# Patient Record
Sex: FEMALE | Race: WHITE | NOT HISPANIC OR LATINO | Employment: OTHER | URBAN - METROPOLITAN AREA
[De-identification: names, ages, dates, MRNs, and addresses within clinical notes are randomized per-mention and may not be internally consistent; named-entity substitution may affect disease eponyms.]

---

## 2021-03-17 LAB
ABO: NORMAL
ALBUMIN: 3.9 G/DL (ref 3.4–5)
ALP BLD-CCNC: 73 U/L (ref 33–136)
ALT SERPL-CCNC: 11 U/L (ref 7–45)
ANION GAP SERPL CALCULATED.3IONS-SCNC: 13 MMOL/L (ref 10–20)
ANTIBODY SCREEN: NORMAL
AST SERPL-CCNC: 18 U/L (ref 9–39)
BICARBONATE: 28 MMOL/L (ref 21–32)
BILIRUB SERPL-MCNC: 0.8 MG/DL (ref 0–1.2)
C-REACTIVE PROTEIN: 1.98 MG/DL
CALCIUM SERPL-MCNC: 10 MG/DL (ref 8.6–10.3)
CHLORIDE BLD-SCNC: 101 MMOL/L (ref 98–107)
CREAT SERPL-MCNC: 0.86 MG/DL (ref 0.5–1)
ERYTHROCYTE [DISTWIDTH] IN BLOOD BY AUTOMATED COUNT: 13.3 % (ref 11.5–14)
GFR AFRICAN AMERICAN: >60 ML/MIN/1.73M2
GFR NON-AFRICAN AMERICAN: >60 ML/MIN/1.73M2
GLUCOSE: 86 MG/DL (ref 74–99)
HCT VFR BLD CALC: 35.3 % (ref 36–46)
HEMOGLOBIN: 11.4 G/DL (ref 12–16)
MCHC RBC AUTO-ENTMCNC: 32.3 G/DL (ref 32–36)
MCV RBC AUTO: 95 FL (ref 80–100)
NUCLEATED RBCS: 0 /100 WBC (ref 0–0)
PLATELET # BLD: 232 X10E9/L (ref 150–450)
POTASSIUM SERPL-SCNC: 4.4 MMOL/L (ref 3.5–5.3)
RBC # BLD: 3.71 X10E12/L (ref 4–5.2)
RH TYPE: NORMAL
SODIUM BLD-SCNC: 138 MMOL/L (ref 136–145)
TOTAL PROTEIN: 7.4 G/DL (ref 6.4–8.2)
UREA NITROGEN: 14 MG/DL (ref 6–23)
WBC: 7 X10E9/L (ref 4.4–11.3)

## 2021-03-22 LAB
SARS-COV-2, PCR: NOT DETECTED
SPECIMEN SOURCE: NORMAL

## 2021-03-24 LAB
ABO: NORMAL
APPEARANCE: CLEAR
BILIRUBIN, URINE: NEGATIVE
BLOOD, URINE: NEGATIVE
COLOR, URINE: NORMAL
GLUCOSE, URINE: NEGATIVE MG/DL
KETONES, URINE: NEGATIVE MG/DL
LEUKOCYTE ESTERASE, URINE: NEGATIVE
NITRITE, URINE: NEGATIVE
PH UA: 7 (ref 5–8)
PROTEIN UA: NEGATIVE MG/DL
RH TYPE: NORMAL
SPECIFIC GRAVITY, URINE: 1.01 (ref 1–1)
SPECIMEN SOURCE: NORMAL
UROBILINOGEN, URINE: <2 MG/DL (ref 0–1.9)

## 2021-03-25 LAB
ANION GAP SERPL CALCULATED.3IONS-SCNC: 10 MMOL/L (ref 10–20)
BASOPHILS # BLD: 0.02 X10E9/L (ref 0–0.1)
BASOPHILS RELATIVE PERCENT: 0.2 % (ref 0–2)
BICARBONATE: 27 MMOL/L (ref 21–32)
CALCIUM SERPL-MCNC: 8.1 MG/DL (ref 8.6–10.3)
CHLORIDE BLD-SCNC: 104 MMOL/L (ref 98–107)
CREAT SERPL-MCNC: 0.78 MG/DL (ref 0.5–1)
EOSINOPHIL # BLD: 0 X10E9/L (ref 0–0.4)
EOSINOPHILS RELATIVE PERCENT: 0 % (ref 0–6)
ERYTHROCYTE [DISTWIDTH] IN BLOOD BY AUTOMATED COUNT: 15.1 % (ref 11.5–14)
GFR AFRICAN AMERICAN: >60 ML/MIN/1.73M2
GFR NON-AFRICAN AMERICAN: >60 ML/MIN/1.73M2
GLUCOSE: 174 MG/DL (ref 74–99)
HCT VFR BLD CALC: 28.9 % (ref 36–46)
HEMOGLOBIN: 9.4 G/DL (ref 12–16)
IMMATURE GRANULOCYTES %: 0.4 % (ref 0–0.9)
LYMPHOCYTES # BLD: 9.9 % (ref 13–44)
LYMPHOCYTES RELATIVE PERCENT: 0.92 X10E9/L (ref 0.8–3)
MCHC RBC AUTO-ENTMCNC: 32.5 G/DL (ref 32–36)
MCV RBC AUTO: 92 FL (ref 80–100)
MONOCYTES # BLD: 0.53 X10E9/L (ref 0.05–0.8)
MONOCYTES RELATIVE PERCENT: 5.7 % (ref 2–10)
NEUTROPHILS RELATIVE PERCENT: 83.8 % (ref 40–80)
NEUTROPHILS: 7.78 X10E9/L (ref 1.6–5.5)
NUCLEATED RBCS: 0 /100 WBC (ref 0–0)
PLATELET # BLD: 175 X10E9/L (ref 150–450)
POTASSIUM SERPL-SCNC: 4 MMOL/L (ref 3.5–5.3)
RBC # BLD: 3.14 X10E12/L (ref 4–5.2)
SODIUM BLD-SCNC: 137 MMOL/L (ref 136–145)
UREA NITROGEN: 15 MG/DL (ref 6–23)
WBC: 9.3 X10E9/L (ref 4.4–11.3)

## 2021-03-26 LAB
ANION GAP SERPL CALCULATED.3IONS-SCNC: 7 MMOL/L (ref 10–20)
BASOPHILS # BLD: 0.05 X10E9/L (ref 0–0.1)
BASOPHILS RELATIVE PERCENT: 0.5 % (ref 0–2)
BICARBONATE: 28 MMOL/L (ref 21–32)
CALCIUM SERPL-MCNC: 8.4 MG/DL (ref 8.6–10.3)
CHLORIDE BLD-SCNC: 106 MMOL/L (ref 98–107)
CREAT SERPL-MCNC: 0.9 MG/DL (ref 0.5–1)
EOSINOPHIL # BLD: 0.07 X10E9/L (ref 0–0.4)
EOSINOPHILS RELATIVE PERCENT: 0.7 % (ref 0–6)
ERYTHROCYTE [DISTWIDTH] IN BLOOD BY AUTOMATED COUNT: 15.3 % (ref 11.5–14)
GFR AFRICAN AMERICAN: >60 ML/MIN/1.73M2
GFR NON-AFRICAN AMERICAN: >60 ML/MIN/1.73M2
GLUCOSE: 104 MG/DL (ref 74–99)
HCT VFR BLD CALC: 26.2 % (ref 36–46)
HEMOGLOBIN: 8.3 G/DL (ref 12–16)
IMMATURE GRANULOCYTES %: 0.2 % (ref 0–0.9)
LYMPHOCYTES # BLD: 24.3 % (ref 13–44)
LYMPHOCYTES RELATIVE PERCENT: 2.33 X10E9/L (ref 0.8–3)
MCHC RBC AUTO-ENTMCNC: 31.7 G/DL (ref 32–36)
MCV RBC AUTO: 94 FL (ref 80–100)
MONOCYTES # BLD: 0.83 X10E9/L (ref 0.05–0.8)
MONOCYTES RELATIVE PERCENT: 8.6 % (ref 2–10)
NEUTROPHILS RELATIVE PERCENT: 65.7 % (ref 40–80)
NEUTROPHILS: 6.3 X10E9/L (ref 1.6–5.5)
NUCLEATED RBCS: 0 /100 WBC (ref 0–0)
PLATELET # BLD: 155 X10E9/L (ref 150–450)
POTASSIUM SERPL-SCNC: 4 MMOL/L (ref 3.5–5.3)
RBC # BLD: 2.79 X10E12/L (ref 4–5.2)
SODIUM BLD-SCNC: 137 MMOL/L (ref 136–145)
UREA NITROGEN: 18 MG/DL (ref 6–23)
VANCOMYCIN TROUGH: 19.7 UG/ML (ref 5–20)
WBC: 9.6 X10E9/L (ref 4.4–11.3)

## 2021-03-27 LAB
ANION GAP SERPL CALCULATED.3IONS-SCNC: 7 MMOL/L (ref 10–20)
BASOPHILS # BLD: 0.03 X10E9/L (ref 0–0.1)
BASOPHILS RELATIVE PERCENT: 0.5 % (ref 0–2)
BICARBONATE: 28 MMOL/L (ref 21–32)
CALCIUM SERPL-MCNC: 8.2 MG/DL (ref 8.6–10.3)
CHLORIDE BLD-SCNC: 108 MMOL/L (ref 98–107)
CREAT SERPL-MCNC: 0.81 MG/DL (ref 0.5–1)
EOSINOPHIL # BLD: 0.26 X10E9/L (ref 0–0.4)
EOSINOPHILS RELATIVE PERCENT: 4.4 % (ref 0–6)
ERYTHROCYTE [DISTWIDTH] IN BLOOD BY AUTOMATED COUNT: 15.2 % (ref 11.5–14)
GFR AFRICAN AMERICAN: >60 ML/MIN/1.73M2
GFR NON-AFRICAN AMERICAN: >60 ML/MIN/1.73M2
GLUCOSE: 93 MG/DL (ref 74–99)
HCT VFR BLD CALC: 25.6 % (ref 36–46)
HEMOGLOBIN: 8.1 G/DL (ref 12–16)
IMMATURE GRANULOCYTES %: 0.7 % (ref 0–0.9)
LYMPHOCYTES # BLD: 31.7 % (ref 13–44)
LYMPHOCYTES RELATIVE PERCENT: 1.89 X10E9/L (ref 0.8–3)
MCHC RBC AUTO-ENTMCNC: 31.6 G/DL (ref 32–36)
MCV RBC AUTO: 95 FL (ref 80–100)
MONOCYTES # BLD: 0.44 X10E9/L (ref 0.05–0.8)
MONOCYTES RELATIVE PERCENT: 7.4 % (ref 2–10)
NEUTROPHILS RELATIVE PERCENT: 55.3 % (ref 40–80)
NEUTROPHILS: 3.31 X10E9/L (ref 1.6–5.5)
NUCLEATED RBCS: 0 /100 WBC (ref 0–0)
PLATELET # BLD: 149 X10E9/L (ref 150–450)
POTASSIUM SERPL-SCNC: 3.9 MMOL/L (ref 3.5–5.3)
RBC # BLD: 2.7 X10E12/L (ref 4–5.2)
SODIUM BLD-SCNC: 139 MMOL/L (ref 136–145)
UREA NITROGEN: 13 MG/DL (ref 6–23)
WBC: 6 X10E9/L (ref 4.4–11.3)

## 2021-06-24 LAB
ABO: NORMAL
ANTIBODY SCREEN: NORMAL
RH TYPE: NORMAL

## 2021-06-28 LAB
SARS-COV-2, PCR: NOT DETECTED
SPECIMEN SOURCE: NORMAL

## 2021-06-30 LAB
ANION GAP SERPL CALCULATED.3IONS-SCNC: 10 MMOL/L (ref 10–25)
BASE EXCESS: 0.7 MMOL/L (ref -2–3)
CARBOXYHEMOGLOBIN: 1.4 %
CHLORIDE BLD-SCNC: 106 MMOL/L (ref 98–107)
GLUCOSE: ABNORMAL MG/DL (ref 74–99)
HCO3 VENOUS: 27 MMOL/L (ref 22–26)
HCT VFR BLD CALC: 31 % (ref 36–46)
HEMOGLOBIN: 10.5 G/DL (ref 12–16)
IONIZED CA: 1.22 MMOL/L (ref 1.1–1.3)
LACTATE: 1 MMOL/L (ref 0.4–2)
METHEMOGLOBIN VENOUS: 1.3 % (ref 0–1.5)
O2 SAT VENOUS: 64 % (ref 45–75)
PATIENT TEMP: 37 DEGREES C
PCO2 VENOUS: 50 MMHG (ref 41–51)
PH VENOUS: 7.34 (ref 7.33–7.4)
PO2, VENOUS: 38 MMHG (ref 35–45)
POTASSIUM SERPL-SCNC: 4.2 MMOL/L (ref 3.5–5.3)
SODIUM BLD-SCNC: 139 MMOL/L (ref 136–145)

## 2021-07-01 LAB
ANION GAP SERPL CALCULATED.3IONS-SCNC: 13 MMOL/L (ref 10–20)
BASOPHILS # BLD: 0.01 X10E9/L (ref 0–0.1)
BASOPHILS RELATIVE PERCENT: 0.1 % (ref 0–2)
BICARBONATE: 22 MMOL/L (ref 21–32)
CALCIUM SERPL-MCNC: 7.8 MG/DL (ref 8.6–10.3)
CHLORIDE BLD-SCNC: 109 MMOL/L (ref 98–107)
CREAT SERPL-MCNC: 0.75 MG/DL (ref 0.5–1)
EOSINOPHIL # BLD: 0 X10E9/L (ref 0–0.4)
EOSINOPHILS RELATIVE PERCENT: 0 % (ref 0–6)
ERYTHROCYTE [DISTWIDTH] IN BLOOD BY AUTOMATED COUNT: 13.5 % (ref 11.5–14)
GFR AFRICAN AMERICAN: >60 ML/MIN/1.73M2
GFR NON-AFRICAN AMERICAN: >60 ML/MIN/1.73M2
GLUCOSE: 97 MG/DL (ref 74–99)
HCT VFR BLD CALC: 25.2 % (ref 36–46)
HEMOGLOBIN: 7.8 G/DL (ref 12–16)
IMMATURE GRANULOCYTES %: 0.7 % (ref 0–0.9)
LYMPHOCYTES # BLD: 10.6 % (ref 13–44)
LYMPHOCYTES RELATIVE PERCENT: 0.91 X10E9/L (ref 0.8–3)
MCHC RBC AUTO-ENTMCNC: 31 G/DL (ref 32–36)
MCV RBC AUTO: 98 FL (ref 80–100)
MONOCYTES # BLD: 0.49 X10E9/L (ref 0.05–0.8)
MONOCYTES RELATIVE PERCENT: 5.7 % (ref 2–10)
NEUTROPHILS RELATIVE PERCENT: 82.9 % (ref 40–80)
NEUTROPHILS: 7.13 X10E9/L (ref 1.6–5.5)
NUCLEATED RBCS: 0 /100 WBC (ref 0–0)
PLATELET # BLD: 158 X10E9/L (ref 150–450)
POTASSIUM SERPL-SCNC: 4.2 MMOL/L (ref 3.5–5.3)
RBC # BLD: 2.57 X10E12/L (ref 4–5.2)
SODIUM BLD-SCNC: 140 MMOL/L (ref 136–145)
UREA NITROGEN: 17 MG/DL (ref 6–23)
WBC: 8.6 X10E9/L (ref 4.4–11.3)

## 2021-07-02 LAB
ANION GAP SERPL CALCULATED.3IONS-SCNC: 10 MMOL/L (ref 10–20)
BASOPHILS # BLD: 0.02 X10E9/L (ref 0–0.1)
BASOPHILS RELATIVE PERCENT: 0.3 % (ref 0–2)
BICARBONATE: 24 MMOL/L (ref 21–32)
CALCIUM SERPL-MCNC: 8.1 MG/DL (ref 8.6–10.3)
CHLORIDE BLD-SCNC: 108 MMOL/L (ref 98–107)
CREAT SERPL-MCNC: 0.73 MG/DL (ref 0.5–1)
EOSINOPHIL # BLD: 0.25 X10E9/L (ref 0–0.4)
EOSINOPHILS RELATIVE PERCENT: 3.5 % (ref 0–6)
ERYTHROCYTE [DISTWIDTH] IN BLOOD BY AUTOMATED COUNT: 14.9 % (ref 11.5–14)
GFR AFRICAN AMERICAN: >60 ML/MIN/1.73M2
GFR NON-AFRICAN AMERICAN: >60 ML/MIN/1.73M2
GLUCOSE: 116 MG/DL (ref 74–99)
HCT VFR BLD CALC: 27.8 % (ref 36–46)
HEMOGLOBIN: 8.6 G/DL (ref 12–16)
IMMATURE GRANULOCYTES %: 0.6 % (ref 0–0.9)
LYMPHOCYTES # BLD: 14.8 % (ref 13–44)
LYMPHOCYTES RELATIVE PERCENT: 1.06 X10E9/L (ref 0.8–3)
MCHC RBC AUTO-ENTMCNC: 30.9 G/DL (ref 32–36)
MCV RBC AUTO: 99 FL (ref 80–100)
MONOCYTES # BLD: 0.58 X10E9/L (ref 0.05–0.8)
MONOCYTES RELATIVE PERCENT: 8.1 % (ref 2–10)
NEUTROPHILS RELATIVE PERCENT: 72.7 % (ref 40–80)
NEUTROPHILS: 5.21 X10E9/L (ref 1.6–5.5)
NUCLEATED RBCS: 0 /100 WBC (ref 0–0)
PLATELET # BLD: 137 X10E9/L (ref 150–450)
POTASSIUM SERPL-SCNC: 4.5 MMOL/L (ref 3.5–5.3)
RBC # BLD: 2.82 X10E12/L (ref 4–5.2)
SODIUM BLD-SCNC: 137 MMOL/L (ref 136–145)
UREA NITROGEN: 12 MG/DL (ref 6–23)
WBC: 7.2 X10E9/L (ref 4.4–11.3)

## 2021-07-04 LAB
SARS-COV-2, PCR: NOT DETECTED
SPECIMEN SOURCE: NORMAL

## 2024-06-26 ENCOUNTER — HOSPITAL ENCOUNTER (OUTPATIENT)
Dept: RADIOLOGY | Facility: HOSPITAL | Age: 79
Discharge: HOME | End: 2024-06-26
Payer: MEDICARE

## 2024-06-26 VITALS — HEIGHT: 64 IN | BODY MASS INDEX: 35.85 KG/M2 | WEIGHT: 210 LBS

## 2024-06-26 DIAGNOSIS — Z12.31 ENCOUNTER FOR SCREENING MAMMOGRAM FOR MALIGNANT NEOPLASM OF BREAST: ICD-10-CM

## 2024-06-26 PROCEDURE — 77067 SCR MAMMO BI INCL CAD: CPT

## 2024-07-02 ENCOUNTER — HOSPITAL ENCOUNTER (OUTPATIENT)
Dept: RADIOLOGY | Facility: EXTERNAL LOCATION | Age: 79
Discharge: HOME | End: 2024-07-02

## 2025-02-11 ENCOUNTER — PRE-ADMISSION TESTING (OUTPATIENT)
Dept: PREADMISSION TESTING | Facility: HOSPITAL | Age: 80
End: 2025-02-11
Payer: MEDICARE

## 2025-02-11 ENCOUNTER — LAB (OUTPATIENT)
Dept: LAB | Facility: HOSPITAL | Age: 80
End: 2025-02-11
Payer: MEDICARE

## 2025-02-11 VITALS
BODY MASS INDEX: 36.65 KG/M2 | HEART RATE: 74 BPM | SYSTOLIC BLOOD PRESSURE: 141 MMHG | HEIGHT: 65 IN | WEIGHT: 220 LBS | TEMPERATURE: 97.2 F | OXYGEN SATURATION: 98 % | RESPIRATION RATE: 16 BRPM | DIASTOLIC BLOOD PRESSURE: 84 MMHG

## 2025-02-11 DIAGNOSIS — Z01.818 PREOP EXAMINATION: ICD-10-CM

## 2025-02-11 DIAGNOSIS — M16.11 UNILATERAL PRIMARY OSTEOARTHRITIS, RIGHT HIP: Primary | ICD-10-CM

## 2025-02-11 DIAGNOSIS — R73.09 OTHER ABNORMAL GLUCOSE: ICD-10-CM

## 2025-02-11 LAB
ABO GROUP (TYPE) IN BLOOD: NORMAL
ANION GAP SERPL CALC-SCNC: 10 MMOL/L (ref 10–20)
ANTIBODY SCREEN: NORMAL
BUN SERPL-MCNC: 27 MG/DL (ref 6–23)
CALCIUM SERPL-MCNC: 8.8 MG/DL (ref 8.6–10.3)
CHLORIDE SERPL-SCNC: 106 MMOL/L (ref 98–107)
CO2 SERPL-SCNC: 28 MMOL/L (ref 21–32)
CREAT SERPL-MCNC: 0.96 MG/DL (ref 0.5–1.05)
EGFRCR SERPLBLD CKD-EPI 2021: 61 ML/MIN/1.73M*2
ERYTHROCYTE [DISTWIDTH] IN BLOOD BY AUTOMATED COUNT: 13.2 % (ref 11.5–14.5)
EST. AVERAGE GLUCOSE BLD GHB EST-MCNC: 94 MG/DL
GLUCOSE SERPL-MCNC: 97 MG/DL (ref 74–99)
HBA1C MFR BLD: 4.9 %
HCT VFR BLD AUTO: 34 % (ref 36–46)
HGB BLD-MCNC: 10.9 G/DL (ref 12–16)
MCH RBC QN AUTO: 32.2 PG (ref 26–34)
MCHC RBC AUTO-ENTMCNC: 32.1 G/DL (ref 32–36)
MCV RBC AUTO: 100 FL (ref 80–100)
NRBC BLD-RTO: 0 /100 WBCS (ref 0–0)
PLATELET # BLD AUTO: 182 X10*3/UL (ref 150–450)
POTASSIUM SERPL-SCNC: 4.2 MMOL/L (ref 3.5–5.3)
RBC # BLD AUTO: 3.39 X10*6/UL (ref 4–5.2)
RH FACTOR (ANTIGEN D): NORMAL
SODIUM SERPL-SCNC: 140 MMOL/L (ref 136–145)
WBC # BLD AUTO: 5.1 X10*3/UL (ref 4.4–11.3)

## 2025-02-11 PROCEDURE — 85027 COMPLETE CBC AUTOMATED: CPT

## 2025-02-11 PROCEDURE — 87081 CULTURE SCREEN ONLY: CPT

## 2025-02-11 PROCEDURE — 83036 HEMOGLOBIN GLYCOSYLATED A1C: CPT

## 2025-02-11 PROCEDURE — 99202 OFFICE O/P NEW SF 15 MIN: CPT

## 2025-02-11 PROCEDURE — 86901 BLOOD TYPING SEROLOGIC RH(D): CPT

## 2025-02-11 PROCEDURE — 93010 ELECTROCARDIOGRAM REPORT: CPT | Performed by: INTERNAL MEDICINE

## 2025-02-11 PROCEDURE — 80048 BASIC METABOLIC PNL TOTAL CA: CPT

## 2025-02-11 PROCEDURE — 86900 BLOOD TYPING SEROLOGIC ABO: CPT

## 2025-02-11 PROCEDURE — 93005 ELECTROCARDIOGRAM TRACING: CPT

## 2025-02-11 PROCEDURE — 86850 RBC ANTIBODY SCREEN: CPT

## 2025-02-11 RX ORDER — FERROUS SULFATE 325(65) MG
325 TABLET ORAL EVERY OTHER DAY
COMMUNITY

## 2025-02-11 RX ORDER — TURMERIC 400 MG
1 CAPSULE ORAL DAILY
COMMUNITY

## 2025-02-11 RX ORDER — CHLORHEXIDINE GLUCONATE ORAL RINSE 1.2 MG/ML
SOLUTION DENTAL
Qty: 473 ML | Refills: 0 | Status: SHIPPED | OUTPATIENT
Start: 2025-02-11

## 2025-02-11 RX ORDER — ATORVASTATIN CALCIUM 10 MG/1
10 TABLET, FILM COATED ORAL DAILY
COMMUNITY

## 2025-02-11 RX ORDER — CALCIUM CARBONATE 500(1250)
1 TABLET,CHEWABLE ORAL DAILY
COMMUNITY

## 2025-02-11 RX ORDER — MULTIVIT-MIN/IRON FUM/FOLIC AC 7.5 MG-4
1 TABLET ORAL DAILY
COMMUNITY

## 2025-02-11 RX ORDER — CITALOPRAM 10 MG/1
10 TABLET ORAL DAILY
COMMUNITY

## 2025-02-11 RX ORDER — LEVOTHYROXINE SODIUM 100 UG/1
100 TABLET ORAL DAILY
COMMUNITY

## 2025-02-11 RX ORDER — LOSARTAN POTASSIUM AND HYDROCHLOROTHIAZIDE 12.5; 5 MG/1; MG/1
1 TABLET ORAL DAILY
COMMUNITY

## 2025-02-11 ASSESSMENT — DUKE ACTIVITY SCORE INDEX (DASI)
CAN YOU WALK INDOORS, SUCH AS AROUND YOUR HOUSE: YES
CAN YOU TAKE CARE OF YOURSELF (EAT, DRESS, BATHE, OR USE TOILET): YES
CAN YOU PARTICIPATE IN MODERATE RECREATIONAL ACTIVITIES LIKE GOLF, BOWLING, DANCING, DOUBLES TENNIS OR THROWING A BASEBALL OR FOOTBALL: NO
CAN YOU DO LIGHT WORK AROUND THE HOUSE LIKE DUSTING OR WASHING DISHES: YES
DASI METS SCORE: 4.1
CAN YOU CLIMB A FLIGHT OF STAIRS OR WALK UP A HILL: NO
CAN YOU HAVE SEXUAL RELATIONS: NO
CAN YOU PARTICIPATE IN STRENOUS SPORTS LIKE SWIMMING, SINGLES TENNIS, FOOTBALL, BASKETBALL, OR SKIING: NO
CAN YOU WALK A BLOCK OR TWO ON LEVEL GROUND: NO
CAN YOU DO MODERATE WORK AROUND THE HOUSE LIKE VACUUMING, SWEEPING FLOORS OR CARRYING GROCERIES: YES
TOTAL_SCORE: 10.7
CAN YOU RUN A SHORT DISTANCE: NO
CAN YOU DO HEAVY WORK AROUND THE HOUSE LIKE SCRUBBING FLOORS OR LIFTING AND MOVING HEAVY FURNITURE: NO
CAN YOU DO YARD WORK LIKE RAKING LEAVES, WEEDING OR PUSHING A MOWER: NO

## 2025-02-11 ASSESSMENT — LIFESTYLE VARIABLES: SMOKING_STATUS: NONSMOKER

## 2025-02-11 ASSESSMENT — ACTIVITIES OF DAILY LIVING (ADL): ADL_SCORE: 1

## 2025-02-11 NOTE — CPM/PAT H&P
CPM/PAT Evaluation       Name: Lila Gay (Lila Gay)  /Age: 1945/79 y.o.     In-Person       Chief Complaint: Right hip pain     HPI  Pleasant 79-year-old female presents with unilateral primary osteoarthritis, right hip for right anterior total hip replacement on 2025. States she has been having pain for over 1 year now. Endorses pain and limited range of motion of her hip. Denies recent fever/illness/chills.     Past Medical History:   Diagnosis Date    Anxiety     CKD (chronic kidney disease)     Hyperlipidemia     Hypertension     Hypothyroidism     Macular degeneration     Sleep apnea        Past Surgical History:   Procedure Laterality Date    ADENOIDECTOMY      CATARACT EXTRACTION      CHOLECYSTECTOMY      COLONOSCOPY      NASAL SEPTUM SURGERY      ORIF HUMERUS FRACTURE      TONSILLECTOMY         Patient  has no history on file for sexual activity.    Family History   Problem Relation Name Age of Onset    Emphysema Mother      Diabetes Sister         Allergies   Allergen Reactions    Daptomycin Rash    Sulfa (Sulfonamide Antibiotics) Hives and Itching    Vancomycin Rash       Prior to Admission medications    Medication Sig Start Date End Date Taking? Authorizing Provider   atorvastatin (Lipitor) 10 mg tablet Take 1 tablet (10 mg) by mouth once daily.    Historical Provider, MD   calcium carbonate 500 mg calcium (1,250 mg) chewable tablet Chew 1 tablet (1,250 mg) once daily.    Historical Provider, MD   citalopram (CeleXA) 10 mg tablet Take 1 tablet (10 mg) by mouth once daily.    Historical Provider, MD   ferrous sulfate, 325 mg ferrous sulfate, tablet Take 1 tablet (325 mg) by mouth every other day.    Historical Provider, MD   levothyroxine (Synthroid, Levoxyl) 100 mcg tablet Take 1 tablet (100 mcg) by mouth early in the morning..    Historical Provider, MD   losartan-hydrochlorothiazide (Hyzaar) 50-12.5 mg tablet Take 1 tablet by mouth once daily.    Historical Provider, MD   multivitamin  with minerals tablet Take 1 tablet by mouth once daily.    Historical Provider, MD   turmeric 400 mg capsule Take 1 capsule by mouth once daily.    Historical Provider, MD   vit C/E/Zn/coppr/lutein/zeaxan (PRESERVISION AREDS-2 ORAL) Take 1 capsule by mouth 2 times a day.    Historical Provider, MD   vitamin D3-folic acid 250 mcg (10,000 unit)-1 mg tablet Take 1 tablet by mouth 1 (one) time per week. friday    Historical Provider, MD        Constitutional: Negative for fever, chills, or sweats   ENMT: Negative for nasal discharge, congestion, ear pain, mouth pain, throat pain. Positive for glasses.   Respiratory: Negative for cough, wheezing, shortness of breath   Cardiac: Negative for chest pain, dyspnea on exertion, palpitations   Gastrointestinal: Negative for nausea, vomiting, diarrhea, constipation, abdominal pain  Genitourinary: Negative for dysuria, flank pain, frequency, hematuria   Musculoskeletal: Negative for decreased ROM, pain, swelling, weakness. See HPI  Neurological: Negative for dizziness, confusion, headache  Psychiatric: Negative for mood changes   Skin: Negative for itching, rash, ulcer    Hematologic/Lymph: Negative for bruising, easy bleeding  Allergic/Immunologic: Negative itching, sneezing, swelling      Physical Exam  Vitals reviewed.   Constitutional:       Appearance: Normal appearance. She is obese.   HENT:      Head: Normocephalic.      Mouth/Throat:      Mouth: Mucous membranes are moist.      Pharynx: Oropharynx is clear.   Eyes:      Pupils: Pupils are equal, round, and reactive to light.   Cardiovascular:      Rate and Rhythm: Normal rate and regular rhythm.      Heart sounds: Normal heart sounds.   Pulmonary:      Effort: Pulmonary effort is normal.      Breath sounds: Normal breath sounds.   Abdominal:      General: Bowel sounds are normal.      Palpations: Abdomen is soft.   Musculoskeletal:         General: Normal range of motion.      Cervical back: Normal range of motion.  "  Skin:     General: Skin is warm and dry.   Neurological:      General: No focal deficit present.      Mental Status: She is alert and oriented to person, place, and time.   Psychiatric:         Mood and Affect: Mood normal.         Behavior: Behavior normal.          PAT AIRWAY:   Airway:     Mallampati::  II    Neck ROM::  Full  normal        Testing/Diagnostic:     Patient Specialist/PCP:   PCP: Dr. Biswas     Visit Vitals  /84   Pulse 74   Temp 36.2 °C (97.2 °F) (Temporal)   Resp 16   Ht 1.638 m (5' 4.5\")   Wt 99.8 kg (220 lb)   SpO2 98%   BMI 37.18 kg/m²   OB Status Postmenopausal   Smoking Status Former   BSA 2.13 m²       DASI Risk Score      Flowsheet Row Pre-Admission Testing from 2/11/2025 in [facility] [place]   Can you take care of yourself (eat, dress, bathe, or use toilet)?  2.75 filed at 02/11/2025 1036   Can you walk indoors, such as around your house? 1.75 filed at 02/11/2025 1036   Can you walk a block or two on level ground?  0 filed at 02/11/2025 1036   Can you climb a flight of stairs or walk up a hill? 0 filed at 02/11/2025 1036   Can you run a short distance? 0 filed at 02/11/2025 1036   Can you do light work around the house like dusting or washing dishes? 2.7 filed at 02/11/2025 1036   Can you do moderate work around the house like vacuuming, sweeping floors or carrying groceries? 3.5 filed at 02/11/2025 1036   Can you do heavy work around the house like scrubbing floors or lifting and moving heavy furniture?  0 filed at 02/11/2025 1036   Can you do yard work like raking leaves, weeding or pushing a mower? 0 filed at 02/11/2025 1036   Can you have sexual relations? 0 filed at 02/11/2025 1036   Can you participate in moderate recreational activities like golf, bowling, dancing, doubles tennis or throwing a baseball or football? 0 filed at 02/11/2025 1036   Can you participate in strenous sports like swimming, singles tennis, football, basketball, or skiing? 0 filed at " 02/11/2025 1036   DASI SCORE 10.7 filed at 02/11/2025 1036   METS Score (Will be calculated only when all the questions are answered) 4.1 filed at 02/11/2025 1036          Capamandai DVT Assessment      Flowsheet Row Pre-Admission Testing from 2/11/2025 in [facility] [place]   DVT Score (IF A SCORE IS NOT CALCULATING, MUST SELECT A BMI TO COMPLETE) 11 filed at 02/11/2025 1034   Surgical Factors Elective major lower extremity arthroplasty filed at 02/11/2025 1034   BMI (BMI MUST BE CHOSEN) 31-40 (Obesity) filed at 02/11/2025 1034          Modified Frailty Index      Flowsheet Row Pre-Admission Testing from 2/11/2025 in [facility] [place]   Non-independent functional status (problems with dressing, bathing, personal grooming, or cooking) 0 filed at 02/11/2025 1036   History of diabetes mellitus  0 filed at 02/11/2025 1036   History of COPD 0 filed at 02/11/2025 1036   History of CHF No filed at 02/11/2025 1036   History of MI 0 filed at 02/11/2025 1036   History of Percutaneous Coronary Intervention, Cardiac Surgery, or Angina No filed at 02/11/2025 1036   Hypertension requiring the use of medication  0.0909 filed at 02/11/2025 1036   Peripheral vascular disease 0 filed at 02/11/2025 1036   Impaired sensorium (cognitive impairement or loss, clouding, or delirium) 0 filed at 02/11/2025 1036   TIA or CVA withouy residual deficit 0 filed at 02/11/2025 1036   Cerebrovascular accident with deficit 0 filed at 02/11/2025 1036   Modified Frailty Index Calculator .0909 filed at 02/11/2025 1036          CHADS2 Stroke Risk  Current as of 45 minutes ago        N/A 3 to 100%: High Risk   2 to < 3%: Medium Risk   0 to < 2%: Low Risk     Last Change: N/A          This score determines the patient's risk of having a stroke if the patient has atrial fibrillation.        This score is not applicable to this patient. Components are not calculated.          Revised Cardiac Risk Index      Flowsheet Row Pre-Admission Testing  from 2/11/2025 in [facility] [place]   High-Risk Surgery (Intraperitoneal, Intrathoracic,Suprainguinal vascular) 0 filed at 02/11/2025 1036   History of ischemic heart disease (History of MI, History of positive exercuse test, Current chest paint considered due to myocardial ischemia, Use of nitrate therapy, ECG with pathological Q Waves) 0 filed at 02/11/2025 1036   History of congestive heart failure (pulmonary edemia, bilateral rales or S3 gallop, Paroxysmal nocturnal dyspnea, CXR showing pulmonary vascular redistribution) 0 filed at 02/11/2025 1036   History of cerebrovascular disease (Prior TIA or stroke) 0 filed at 02/11/2025 1036   Pre-operative insulin treatment 0 filed at 02/11/2025 1036   Pre-operative creatinine>2 mg/dl 0 filed at 02/11/2025 1036   Revised Cardiac Risk Calculator 0 filed at 02/11/2025 1036          Apfel Simplified Score      Flowsheet Row Pre-Admission Testing from 2/11/2025 in [facility] [place]   Smoking status 1 filed at 02/11/2025 1036   History of motion sickness or PONV  0 filed at 02/11/2025 1036   Use of postoperative opioids 1 filed at 02/11/2025 1036   Gender - Female 1=Yes filed at 02/11/2025 1036   Apfel Simplified Score Calculator 3 filed at 02/11/2025 1036          Risk Analysis Index Results This Encounter         2/11/2025  1036             Do you live in a place other than your own home?: 0    When did you begin living in the place you are currently residing?: Three months or greater    Any kidney failure, kidney not working well, or seeing a kidney doctor (nephrologist)? If yes, was this for kidney stones or another problem?: 0 No    Any history of chronic (long-term) congestive heart failure (CHF)?: 0 No    Any shortness of breath when resting?: 0 No    In the past five years, have you been diagnosed with or treated for cancer?: No    During the last 3 months has it become difficult for you to remember things or organize your thoughts?: 0 No    Have you  lost weight of 10 pounds or more in the past 3 months without trying?: 0 No    Do you have any loss of appetitie?: 0 No    Getting Around (Mobility): 1 Needs help from cane, walker, or scooter    Eatin Can plan and prepare own meals    Toiletin Can use toilet without any help    Personal Hygiene (Bathing, Hand Washing, Changing Clothes): 0 Can shower or bathe without any help    NEWMAN Cancer History: Patient does not indicate history of cancer    Total Risk Analysis Index Score Without Cancer: 25    Total Risk Analysis Index Score: 25          Stop Bang Score      Flowsheet Row Pre-Admission Testing from 2025 in [facility]   Do you snore loudly? 1 filed at 2025 1035   Do you often feel tired or fatigued after your sleep? 1 filed at 2025 1035   Has anyone ever observed you stop breathing in your sleep? 1 filed at 2025 1035   Do you have or are you being treated for high blood pressure? 0 filed at 2025 1035   Recent BMI (Calculated) 37.2 filed at 2025 1035   Is BMI greater than 35 kg/m2? 1=Yes filed at 2025 1035   Age older than 50 years old? 1=Yes filed at 2025 1035   Is your neck circumference greater than 17 inches (Male) or 16 inches (Female)? 1 filed at 2025 1035   Gender - Male 0=No filed at 2025 1035   STOP-BANG Total Score 6 filed at 2025 1035          Prodigy: High Risk  Total Score: 12              Prodigy Age Score           ARISCAT Score for Postoperative Pulmonary Complications      Flowsheet Row Pre-Admission Testing from 2025 in [facility]   Age Calculated Score 3 filed at 2025 1037   Preoperative SpO2 0 filed at 2025 1037   Respiratory infection in the last month Either upper or lower (i.e., URI, bronchitis, pneumonia), with fever and antibiotic treatment 0 filed at 2025 1037   Preoperative anemia (Hgb less than 10 g/dl) 0 filed at 2025 1037   Surgical incision  0 filed at  02/11/2025 1037   Duration of surgery  16 filed at 02/11/2025 1037   Emergency Procedure  0 filed at 02/11/2025 1037   ARISCAT Total Score  19 filed at 02/11/2025 1037          Eladio Perioperative Risk for Myocardial Infarction or Cardiac Arrest (DUSTY)      Flowsheet Row Pre-Admission Testing from 2/11/2025 in [facility]   Calculated Age Score 1.54 filed at 02/11/2025 1037   Functional Status  0 filed at 02/11/2025 1037   ASA Class  -3.29 filed at 02/11/2025 1037   Creatinine 0 filed at 02/11/2025 1037   Type of Procedure  0.80 filed at 02/11/2025 1037   DUSTY Total Score  -6.2 filed at 02/11/2025 1037   DUSTY % 0.2 filed at 02/11/2025 1037            Assessment and Plan:     Assessment and Plan:     Preop:   OR with Dr. Song on 2/18/25  Labs ordered per anesthesia guidelines.   EKG obtained and enclosed. NSR. VR: 70 BPM     Medical clearance on file from Dr. Biswas     Neurologic:   The patient is at an increased risk for post operative delirium secondary to age >/=65 , decrease functional status, polypharmacy , and type and duration of surgery . Preoperative brain exercise educational handout provided to patient.  The patient is at an increased risk for perioperative stroke secondary to HTN, HLD, female sex , and general anesthesia.    Cardiac:  Hypertension: Controlled with medical management   Hyperlipidemia: On statin   Duke Activity Status Index (DASI)  DASI Score: 10.7   MET Score: 4.1  RCRI  0 which is 3.9% 30 day risk of MACE (risk for cardiac death, nonfatal myocardial infarction, and nonfactal cardiac arrest)  DUSTY score which indicates a  0.2 % risk of intraoperative or 30-day postoperative MACE    Pulmonary:   RONEY: Uses CPAP  STOP-BANG score of  6.  High risk of obstructive sleep apnea.   ARISCAT:   19   points which is a low (1.6%) risk of in-hospital post-op pulmonary complications     Endocrine:  Hypothyroidism: On levothyroxine     GI:  Apfel: 3 points 61% risk for post operative  N/V    /Renal:   CKD: Stable based on recent lab work.     Neuro-muscular:   Osteoarthritis: Reason for upcoming procedure     Psychiatric:   Anxiety: Stable on celexa      Hematologic:   Caprini score 11, patient at high risk for perioperative DVT. Patient provided with VTE education/handout.     Skin check: Patient was instructed to make surgeon aware of any skin changes/concerns prior to surgery.     Anesthesia: No history of anesthesia complications. No anesthesia concerns.      *See risk scores as previously documented

## 2025-02-11 NOTE — PREPROCEDURE INSTRUCTIONS
Thank you for visiting Preadmission Testing at [facility]. If you have any changes to your health condition, please call the SURGEON's office to alert them and give them details of your symptoms.  STOP all NSAIDS (Ibuprofen, Motrin, Aleve), vitamins, herbals, supplements, and all over the counter medications for 7 days prior to surgery  Tylenol is okay to take up until the morning of surgery for pain or headache if needed         Preoperative Brain Exercises    What are brain exercises?  A brain exercise is any activity that engages your thinking (cognitive) skills.    What types of activities are considered brain exercises?  Jigsaw puzzles, crossword puzzles, word jumble, memory games, word search, and many more.  Many can be found free online or on your phone via a mobile anil.    Why should I do brain exercises before my surgery?  More recent research has shown brain exercise before surgery can lower the risk of postoperative delirium (confusion) which can be especially important for older adults.  Patients who did brain exercises for 5 to 10 hours the days before surgery, cut their risk of postoperative delirium in half up to 1 week after surgery.      Preoperative Deep Breathing Exercises    Why it is important to do deep breathing exercises before my surgery?  Deep breathing exercises strengthen your breathing muscles.  This helps you to recover after your surgery and decreases the chance of breathing complications.    How are the deep breathing exercises done?  Sit straight with your back supported.  Breathe in deeply and slowly through your nose. Your lower rib cage should expand and your abdomen may move forward.  Hold that breath for 3 to 5 seconds.  Breathe out through pursed lips, slowly and completely.  Rest and repeat 10 times every hour while awake.  Rest longer if you become dizzy or lightheaded.      Patient and Family Education   Ways You Can Help Prevent Blood Clots     This handout explains some simple  things you can do to help prevent blood clots.      Blood clots are blockages that can form in the body's veins. When a blood clot forms in your deep veins, it may be called a deep vein thrombosis, or DVT for short. Blood clots can happen in any part of the body where blood flows, but they are most common in the arms and legs. If a piece of a blood clot breaks free and travels to the lungs, it is called a pulmonary embolus (PE). A PE can be a very serious problem.      Being in the hospital or having surgery can raise your chances of getting a blood clot because you may not be well enough to move around as much as you normally do.      Ways you can help prevent blood clots in the hospital         Wearing SCDs. SCDs stands for Sequential Compression Devices.   SCDs are special sleeves that wrap around your legs  They attach to a pump that fills them with air to gently squeeze your legs every few minutes.   This helps return the blood in your legs to your heart.   SCDs should only be taken off when walking or bathing.   SCDs may not be comfortable, but they can help save your life.               Wearing compression stockings - if your doctor orders them. These special snug fitting stockings gently squeeze your legs to help blood flow.       Walking. Walking helps move the blood in your legs.   If your doctor says it is ok, try walking the halls at least   5 times a day. Ask us to help you get up, so you don't fall.      Taking any blood thinning medicines your doctor orders.          ©[facility]; 3/23        Ways you can help prevent blood clots at home       Wearing compression stockings - if your doctor orders them. ? Walking - to help move the blood in your legs.       Taking any blood thinning medicines your doctor orders.      Signs of a blood clot or PE      Tell your doctor or nurse know right away if you have of the problems listed below.    If you are at home, seek medical care right away. Call [phone]  for chest pain or problems breathing.          Signs of a blood clot (DVT) - such as pain,  swelling, redness or warmth in your arm or leg      Signs of a pulmonary embolism (PE) - such as chest     pain or feeling short of breath

## 2025-02-11 NOTE — H&P (VIEW-ONLY)
CPM/PAT Evaluation       Name: Lila Gay (Lila Gay)  /Age: 1945/79 y.o.     In-Person       Chief Complaint: Right hip pain     HPI  Pleasant 79-year-old female presents with unilateral primary osteoarthritis, right hip for right anterior total hip replacement on 2025. States she has been having pain for over 1 year now. Endorses pain and limited range of motion of her hip. Denies recent fever/illness/chills.     Past Medical History:   Diagnosis Date    Anxiety     CKD (chronic kidney disease)     Hyperlipidemia     Hypertension     Hypothyroidism     Macular degeneration     Sleep apnea        Past Surgical History:   Procedure Laterality Date    ADENOIDECTOMY      CATARACT EXTRACTION      CHOLECYSTECTOMY      COLONOSCOPY      NASAL SEPTUM SURGERY      ORIF HUMERUS FRACTURE      TONSILLECTOMY         Patient  has no history on file for sexual activity.    Family History   Problem Relation Name Age of Onset    Emphysema Mother      Diabetes Sister         Allergies   Allergen Reactions    Daptomycin Rash    Sulfa (Sulfonamide Antibiotics) Hives and Itching    Vancomycin Rash       Prior to Admission medications    Medication Sig Start Date End Date Taking? Authorizing Provider   atorvastatin (Lipitor) 10 mg tablet Take 1 tablet (10 mg) by mouth once daily.    Historical Provider, MD   calcium carbonate 500 mg calcium (1,250 mg) chewable tablet Chew 1 tablet (1,250 mg) once daily.    Historical Provider, MD   citalopram (CeleXA) 10 mg tablet Take 1 tablet (10 mg) by mouth once daily.    Historical Provider, MD   ferrous sulfate, 325 mg ferrous sulfate, tablet Take 1 tablet (325 mg) by mouth every other day.    Historical Provider, MD   levothyroxine (Synthroid, Levoxyl) 100 mcg tablet Take 1 tablet (100 mcg) by mouth early in the morning..    Historical Provider, MD   losartan-hydrochlorothiazide (Hyzaar) 50-12.5 mg tablet Take 1 tablet by mouth once daily.    Historical Provider, MD   multivitamin  with minerals tablet Take 1 tablet by mouth once daily.    Historical Provider, MD   turmeric 400 mg capsule Take 1 capsule by mouth once daily.    Historical Provider, MD   vit C/E/Zn/coppr/lutein/zeaxan (PRESERVISION AREDS-2 ORAL) Take 1 capsule by mouth 2 times a day.    Historical Provider, MD   vitamin D3-folic acid 250 mcg (10,000 unit)-1 mg tablet Take 1 tablet by mouth 1 (one) time per week. friday    Historical Provider, MD        Constitutional: Negative for fever, chills, or sweats   ENMT: Negative for nasal discharge, congestion, ear pain, mouth pain, throat pain. Positive for glasses.   Respiratory: Negative for cough, wheezing, shortness of breath   Cardiac: Negative for chest pain, dyspnea on exertion, palpitations   Gastrointestinal: Negative for nausea, vomiting, diarrhea, constipation, abdominal pain  Genitourinary: Negative for dysuria, flank pain, frequency, hematuria   Musculoskeletal: Negative for decreased ROM, pain, swelling, weakness. See HPI  Neurological: Negative for dizziness, confusion, headache  Psychiatric: Negative for mood changes   Skin: Negative for itching, rash, ulcer    Hematologic/Lymph: Negative for bruising, easy bleeding  Allergic/Immunologic: Negative itching, sneezing, swelling      Physical Exam  Vitals reviewed.   Constitutional:       Appearance: Normal appearance. She is obese.   HENT:      Head: Normocephalic.      Mouth/Throat:      Mouth: Mucous membranes are moist.      Pharynx: Oropharynx is clear.   Eyes:      Pupils: Pupils are equal, round, and reactive to light.   Cardiovascular:      Rate and Rhythm: Normal rate and regular rhythm.      Heart sounds: Normal heart sounds.   Pulmonary:      Effort: Pulmonary effort is normal.      Breath sounds: Normal breath sounds.   Abdominal:      General: Bowel sounds are normal.      Palpations: Abdomen is soft.   Musculoskeletal:         General: Normal range of motion.      Cervical back: Normal range of motion.  "  Skin:     General: Skin is warm and dry.   Neurological:      General: No focal deficit present.      Mental Status: She is alert and oriented to person, place, and time.   Psychiatric:         Mood and Affect: Mood normal.         Behavior: Behavior normal.          PAT AIRWAY:   Airway:     Mallampati::  II    Neck ROM::  Full  normal        Testing/Diagnostic:     Patient Specialist/PCP:   PCP: Dr. Biswas     Visit Vitals  /84   Pulse 74   Temp 36.2 °C (97.2 °F) (Temporal)   Resp 16   Ht 1.638 m (5' 4.5\")   Wt 99.8 kg (220 lb)   SpO2 98%   BMI 37.18 kg/m²   OB Status Postmenopausal   Smoking Status Former   BSA 2.13 m²       DASI Risk Score      Flowsheet Row Pre-Admission Testing from 2/11/2025 in [facility]   Can you take care of yourself (eat, dress, bathe, or use toilet)?  2.75 filed at 02/11/2025 1036   Can you walk indoors, such as around your house? 1.75 filed at 02/11/2025 1036   Can you walk a block or two on level ground?  0 filed at 02/11/2025 1036   Can you climb a flight of stairs or walk up a hill? 0 filed at 02/11/2025 1036   Can you run a short distance? 0 filed at 02/11/2025 1036   Can you do light work around the house like dusting or washing dishes? 2.7 filed at 02/11/2025 1036   Can you do moderate work around the house like vacuuming, sweeping floors or carrying groceries? 3.5 filed at 02/11/2025 1036   Can you do heavy work around the house like scrubbing floors or lifting and moving heavy furniture?  0 filed at 02/11/2025 1036   Can you do yard work like raking leaves, weeding or pushing a mower? 0 filed at 02/11/2025 1036   Can you have sexual relations? 0 filed at 02/11/2025 1036   Can you participate in moderate recreational activities like golf, bowling, dancing, doubles tennis or throwing a baseball or football? 0 filed at 02/11/2025 1036   Can you participate in strenous sports like swimming, singles tennis, football, basketball, or skiing? 0 filed at " 02/11/2025 1036   DASI SCORE 10.7 filed at 02/11/2025 1036   METS Score (Will be calculated only when all the questions are answered) 4.1 filed at 02/11/2025 1036          Capamandai DVT Assessment      Flowsheet Row Pre-Admission Testing from 2/11/2025 in [facility]   DVT Score (IF A SCORE IS NOT CALCULATING, MUST SELECT A BMI TO COMPLETE) 11 filed at 02/11/2025 1034   Surgical Factors Elective major lower extremity arthroplasty filed at 02/11/2025 1034   BMI (BMI MUST BE CHOSEN) 31-40 (Obesity) filed at 02/11/2025 1034          Modified Frailty Index      Flowsheet Row Pre-Admission Testing from 2/11/2025 in [facility]   Non-independent functional status (problems with dressing, bathing, personal grooming, or cooking) 0 filed at 02/11/2025 1036   History of diabetes mellitus  0 filed at 02/11/2025 1036   History of COPD 0 filed at 02/11/2025 1036   History of CHF No filed at 02/11/2025 1036   History of MI 0 filed at 02/11/2025 1036   History of Percutaneous Coronary Intervention, Cardiac Surgery, or Angina No filed at 02/11/2025 1036   Hypertension requiring the use of medication  0.0909 filed at 02/11/2025 1036   Peripheral vascular disease 0 filed at 02/11/2025 1036   Impaired sensorium (cognitive impairement or loss, clouding, or delirium) 0 filed at 02/11/2025 1036   TIA or CVA withouy residual deficit 0 filed at 02/11/2025 1036   Cerebrovascular accident with deficit 0 filed at 02/11/2025 1036   Modified Frailty Index Calculator .0909 filed at 02/11/2025 1036          CHADS2 Stroke Risk  Current as of 45 minutes ago        N/A 3 to 100%: High Risk   2 to < 3%: Medium Risk   0 to < 2%: Low Risk     Last Change: N/A          This score determines the patient's risk of having a stroke if the patient has atrial fibrillation.        This score is not applicable to this patient. Components are not calculated.          Revised Cardiac Risk Index      Flowsheet Row Pre-Admission Testing  from 2/11/2025 in [facility]   High-Risk Surgery (Intraperitoneal, Intrathoracic,Suprainguinal vascular) 0 filed at 02/11/2025 1036   History of ischemic heart disease (History of MI, History of positive exercuse test, Current chest paint considered due to myocardial ischemia, Use of nitrate therapy, ECG with pathological Q Waves) 0 filed at 02/11/2025 1036   History of congestive heart failure (pulmonary edemia, bilateral rales or S3 gallop, Paroxysmal nocturnal dyspnea, CXR showing pulmonary vascular redistribution) 0 filed at 02/11/2025 1036   History of cerebrovascular disease (Prior TIA or stroke) 0 filed at 02/11/2025 1036   Pre-operative insulin treatment 0 filed at 02/11/2025 1036   Pre-operative creatinine>2 mg/dl 0 filed at 02/11/2025 1036   Revised Cardiac Risk Calculator 0 filed at 02/11/2025 1036          Apfel Simplified Score      Flowsheet Row Pre-Admission Testing from 2/11/2025 in [facility]   Smoking status 1 filed at 02/11/2025 1036   History of motion sickness or PONV  0 filed at 02/11/2025 1036   Use of postoperative opioids 1 filed at 02/11/2025 1036   Gender - Female 1=Yes filed at 02/11/2025 1036   Apfel Simplified Score Calculator 3 filed at 02/11/2025 1036          Risk Analysis Index Results This Encounter         2/11/2025  1036             Do you live in a place other than your own home?: 0    When did you begin living in the place you are currently residing?: Three months or greater    Any kidney failure, kidney not working well, or seeing a kidney doctor (nephrologist)? If yes, was this for kidney stones or another problem?: 0 No    Any history of chronic (long-term) congestive heart failure (CHF)?: 0 No    Any shortness of breath when resting?: 0 No    In the past five years, have you been diagnosed with or treated for cancer?: No    During the last 3 months has it become difficult for you to remember things or organize your thoughts?: 0 No    Have you  lost weight of 10 pounds or more in the past 3 months without trying?: 0 No    Do you have any loss of appetitie?: 0 No    Getting Around (Mobility): 1 Needs help from cane, walker, or scooter    Eatin Can plan and prepare own meals    Toiletin Can use toilet without any help    Personal Hygiene (Bathing, Hand Washing, Changing Clothes): 0 Can shower or bathe without any help    NEWMAN Cancer History: Patient does not indicate history of cancer    Total Risk Analysis Index Score Without Cancer: 25    Total Risk Analysis Index Score: 25          Stop Bang Score      Flowsheet Row Pre-Admission Testing from 2025 in [facility] [place]   Do you snore loudly? 1 filed at 2025 1035   Do you often feel tired or fatigued after your sleep? 1 filed at 2025 1035   Has anyone ever observed you stop breathing in your sleep? 1 filed at 2025 1035   Do you have or are you being treated for high blood pressure? 0 filed at 2025 1035   Recent BMI (Calculated) 37.2 filed at 2025 1035   Is BMI greater than 35 kg/m2? 1=Yes filed at 2025 1035   Age older than 50 years old? 1=Yes filed at 2025 1035   Is your neck circumference greater than 17 inches (Male) or 16 inches (Female)? 1 filed at 2025 1035   Gender - Male 0=No filed at 2025 1035   STOP-BANG Total Score 6 filed at 2025 1035          Prodigy: High Risk  Total Score: 12              Prodigy Age Score           ARISCAT Score for Postoperative Pulmonary Complications      Flowsheet Row Pre-Admission Testing from 2025 in [facility] [place]   Age Calculated Score 3 filed at 2025 1037   Preoperative SpO2 0 filed at 2025 1037   Respiratory infection in the last month Either upper or lower (i.e., URI, bronchitis, pneumonia), with fever and antibiotic treatment 0 filed at 2025 1037   Preoperative anemia (Hgb less than 10 g/dl) 0 filed at 2025 1037   Surgical incision  0 filed at  02/11/2025 1037   Duration of surgery  16 filed at 02/11/2025 1037   Emergency Procedure  0 filed at 02/11/2025 1037   ARISCAT Total Score  19 filed at 02/11/2025 1037          Eladio Perioperative Risk for Myocardial Infarction or Cardiac Arrest (DUSTY)      Flowsheet Row Pre-Admission Testing from 2/11/2025 in [facility]   Calculated Age Score 1.54 filed at 02/11/2025 1037   Functional Status  0 filed at 02/11/2025 1037   ASA Class  -3.29 filed at 02/11/2025 1037   Creatinine 0 filed at 02/11/2025 1037   Type of Procedure  0.80 filed at 02/11/2025 1037   DUSTY Total Score  -6.2 filed at 02/11/2025 1037   DUSTY % 0.2 filed at 02/11/2025 1037            Assessment and Plan:     Assessment and Plan:     Preop:   OR with Dr. Song on 2/18/25  Labs ordered per anesthesia guidelines.   EKG obtained and enclosed. NSR. VR: 70 BPM     Medical clearance on file from Dr. Biswas     Neurologic:   The patient is at an increased risk for post operative delirium secondary to age >/=65 , decrease functional status, polypharmacy , and type and duration of surgery . Preoperative brain exercise educational handout provided to patient.  The patient is at an increased risk for perioperative stroke secondary to HTN, HLD, female sex , and general anesthesia.    Cardiac:  Hypertension: Controlled with medical management   Hyperlipidemia: On statin   Duke Activity Status Index (DASI)  DASI Score: 10.7   MET Score: 4.1  RCRI  0 which is 3.9% 30 day risk of MACE (risk for cardiac death, nonfatal myocardial infarction, and nonfactal cardiac arrest)  DUSTY score which indicates a  0.2 % risk of intraoperative or 30-day postoperative MACE    Pulmonary:   RONEY: Uses CPAP  STOP-BANG score of  6.  High risk of obstructive sleep apnea.   ARISCAT:   19   points which is a low (1.6%) risk of in-hospital post-op pulmonary complications     Endocrine:  Hypothyroidism: On levothyroxine     GI:  Apfel: 3 points 61% risk for post operative  N/V    /Renal:   CKD: Stable based on recent lab work.     Neuro-muscular:   Osteoarthritis: Reason for upcoming procedure     Psychiatric:   Anxiety: Stable on celexa      Hematologic:   Caprini score 11, patient at high risk for perioperative DVT. Patient provided with VTE education/handout.     Skin check: Patient was instructed to make surgeon aware of any skin changes/concerns prior to surgery.     Anesthesia: No history of anesthesia complications. No anesthesia concerns.      *See risk scores as previously documented

## 2025-02-11 NOTE — PREPROCEDURE INSTRUCTIONS
Medication List            Accurate as of February 11, 2025 10:48 AM. Always use your most recent med list.                atorvastatin 10 mg tablet  Commonly known as: Lipitor  Medication Adjustments for Surgery: Take/Use as prescribed     calcium carbonate 500 mg calcium (1,250 mg) chewable tablet  Additional Medication Adjustments for Surgery: Take last dose 7 days before surgery     chlorhexidine 0.12 % solution  Commonly known as: Peridex  15 milliliter(s) orally once a day for 2 doses 15 ml  the night before surgery and 15 ml morning of surgery - swish for 30 seconds -DO NOT SWALLOW, SPIT OUT     citalopram 10 mg tablet  Commonly known as: CeleXA  Medication Adjustments for Surgery: Take/Use as prescribed     ferrous sulfate (325 mg ferrous sulfate) tablet  Medication Adjustments for Surgery: Do Not take on the morning of surgery     levothyroxine 100 mcg tablet  Commonly known as: Synthroid, Levoxyl  Medication Adjustments for Surgery: Take/Use as prescribed     losartan-hydrochlorothiazide 50-12.5 mg tablet  Commonly known as: Hyzaar  Additional Medication Adjustments for Surgery: Other (Comment)  Notes to patient: HOLD any evening dose the night before the day of surgery  HOLD the day of surgery     multivitamin with minerals tablet  Additional Medication Adjustments for Surgery: Take last dose 7 days before surgery     PRESERVISION AREDS-2 ORAL  Additional Medication Adjustments for Surgery: Take last dose 7 days before surgery     turmeric 400 mg capsule  Additional Medication Adjustments for Surgery: Take last dose 7 days before surgery     vitamin D3-folic acid 250 mcg (10,000 unit)-1 mg tablet  Additional Medication Adjustments for Surgery: Take last dose 7 days before surgery              PRE-OPERATIVE INSTRUCTIONS    You will receive notification one business day prior to your procedure to confirm your arrival time. It is important that you answer your phone and/or check your messages during this  time. If you do not hear from the surgery center by 5 pm. the day before your procedure, please call [phone].     Please enter the building through the Outpatient entrance and take the elevator off the lobby to the 2nd floor then check in at the Outpatient Surgery desk on the 2nd floor.    INSTRUCTIONS:  Talk to your surgeon for instructions if you should stop your aspirin, blood thinner, or diabetes medicines.  DO NOT take any multivitamins or over the counter supplements for 7-10 days before surgery.  If not being admitted, you must have an adult immediately available to drive you home after surgery. We also highly recommend you have someone stay with you for the entire day and night of your surgery.  For children having surgery, a parent or legal guardian must accompany them to the surgery center. If this is not possible, please call [phone] to make additional arrangements.  For adults who are unable to consent or make medical decisions for themselves, a legal guardian or Power of  must accompany them to the surgery center. If this is not possible, please call [phone] to make additional arrangements.  Wear comfortable, loose fitting clothing.  All jewelry and piercings must be removed. If you are unable to remove an item or have a dermal piercing, please be sure to tell the nurse when you arrive for surgery.  Nail polish and make-up must be removed.  Avoid smoking or consuming alcohol for 24 hours before surgery.  To help prevent infection, please take a shower/bath and wash your hair the night before and/or morning of surgery (or follow other specific bathing instructions provided).    Preoperative Fasting Guidelines    Why must I stop eating and drinking near surgery time?  With sedation, food or liquid in your stomach can enter your lungs causing serious complications  Increases nausea and vomiting    When do I need to stop eating and drinking before my surgery?  Do not eat any solid  food after midnight the night before your surgery/procedure unless otherwise instructed by your surgeon.   You may have up to 13.5 ounces of clear liquid until TWO hours before your instructed arrival time to the hospital.  This includes water, black tea/coffee, (no milk or cream) apple juice, and electrolyte drinks (Gatorade).   You may chew gum until TWO hours before your surgery/procedure      If applicable, notify your surgeons office immediately of any new skin changes that occur to the surgical limb.      If you have any questions or concerns, please call Pre-Admission Testing at [phone].

## 2025-02-13 LAB — STAPHYLOCOCCUS SPEC CULT: NORMAL

## 2025-02-15 LAB
ATRIAL RATE: 70 BPM
P AXIS: 46 DEGREES
P OFFSET: 195 MS
P ONSET: 139 MS
PR INTERVAL: 154 MS
Q ONSET: 216 MS
QRS COUNT: 12 BEATS
QRS DURATION: 96 MS
QT INTERVAL: 424 MS
QTC CALCULATION(BAZETT): 457 MS
QTC FREDERICIA: 446 MS
R AXIS: -1 DEGREES
T AXIS: 11 DEGREES
T OFFSET: 428 MS
VENTRICULAR RATE: 70 BPM

## 2025-03-04 ENCOUNTER — ANESTHESIA (OUTPATIENT)
Dept: OPERATING ROOM | Facility: HOSPITAL | Age: 80
End: 2025-03-04
Payer: MEDICARE

## 2025-03-04 ENCOUNTER — APPOINTMENT (OUTPATIENT)
Dept: RADIOLOGY | Facility: HOSPITAL | Age: 80
End: 2025-03-04
Payer: MEDICARE

## 2025-03-04 ENCOUNTER — HOSPITAL ENCOUNTER (OUTPATIENT)
Facility: HOSPITAL | Age: 80
Discharge: HOME HEALTH | End: 2025-03-06
Attending: ORTHOPAEDIC SURGERY | Admitting: ORTHOPAEDIC SURGERY
Payer: MEDICARE

## 2025-03-04 ENCOUNTER — ANESTHESIA EVENT (OUTPATIENT)
Dept: OPERATING ROOM | Facility: HOSPITAL | Age: 80
End: 2025-03-04
Payer: MEDICARE

## 2025-03-04 DIAGNOSIS — Z96.641 S/P TOTAL RIGHT HIP ARTHROPLASTY: Primary | ICD-10-CM

## 2025-03-04 LAB
ABO GROUP (TYPE) IN BLOOD: NORMAL
ANTIBODY SCREEN: NORMAL
RH FACTOR (ANTIGEN D): NORMAL

## 2025-03-04 PROCEDURE — 2500000005 HC RX 250 GENERAL PHARMACY W/O HCPCS: Performed by: ORTHOPAEDIC SURGERY

## 2025-03-04 PROCEDURE — 99100 ANES PT EXTEME AGE<1 YR&>70: CPT | Performed by: ANESTHESIOLOGY

## 2025-03-04 PROCEDURE — 36415 COLL VENOUS BLD VENIPUNCTURE: CPT | Performed by: ORTHOPAEDIC SURGERY

## 2025-03-04 PROCEDURE — C1713 ANCHOR/SCREW BN/BN,TIS/BN: HCPCS | Performed by: ORTHOPAEDIC SURGERY

## 2025-03-04 PROCEDURE — A27130 PR TOTAL HIP ARTHROPLASTY: Performed by: ANESTHESIOLOGY

## 2025-03-04 PROCEDURE — A6213 FOAM DRG >16<=48 SQ IN W/BDR: HCPCS | Performed by: ORTHOPAEDIC SURGERY

## 2025-03-04 PROCEDURE — 2500000004 HC RX 250 GENERAL PHARMACY W/ HCPCS (ALT 636 FOR OP/ED): Performed by: ORTHOPAEDIC SURGERY

## 2025-03-04 PROCEDURE — 3700000001 HC GENERAL ANESTHESIA TIME - INITIAL BASE CHARGE: Performed by: ORTHOPAEDIC SURGERY

## 2025-03-04 PROCEDURE — C1776 JOINT DEVICE (IMPLANTABLE): HCPCS | Performed by: ORTHOPAEDIC SURGERY

## 2025-03-04 PROCEDURE — 2500000005 HC RX 250 GENERAL PHARMACY W/O HCPCS: Performed by: NURSE ANESTHETIST, CERTIFIED REGISTERED

## 2025-03-04 PROCEDURE — 2720000007 HC OR 272 NO HCPCS: Performed by: ORTHOPAEDIC SURGERY

## 2025-03-04 PROCEDURE — 2500000001 HC RX 250 WO HCPCS SELF ADMINISTERED DRUGS (ALT 637 FOR MEDICARE OP): Performed by: ORTHOPAEDIC SURGERY

## 2025-03-04 PROCEDURE — 7100000001 HC RECOVERY ROOM TIME - INITIAL BASE CHARGE: Performed by: ORTHOPAEDIC SURGERY

## 2025-03-04 PROCEDURE — 7100000002 HC RECOVERY ROOM TIME - EACH INCREMENTAL 1 MINUTE: Performed by: ORTHOPAEDIC SURGERY

## 2025-03-04 PROCEDURE — A27130 PR TOTAL HIP ARTHROPLASTY: Performed by: NURSE ANESTHETIST, CERTIFIED REGISTERED

## 2025-03-04 PROCEDURE — 2500000004 HC RX 250 GENERAL PHARMACY W/ HCPCS (ALT 636 FOR OP/ED): Performed by: NURSE ANESTHETIST, CERTIFIED REGISTERED

## 2025-03-04 PROCEDURE — 2500000004 HC RX 250 GENERAL PHARMACY W/ HCPCS (ALT 636 FOR OP/ED): Mod: JZ | Performed by: ANESTHESIOLOGY

## 2025-03-04 PROCEDURE — 2500000005 HC RX 250 GENERAL PHARMACY W/O HCPCS: Performed by: ANESTHESIOLOGY

## 2025-03-04 PROCEDURE — 72170 X-RAY EXAM OF PELVIS: CPT

## 2025-03-04 PROCEDURE — 86901 BLOOD TYPING SEROLOGIC RH(D): CPT | Performed by: ORTHOPAEDIC SURGERY

## 2025-03-04 PROCEDURE — 2780000003 HC OR 278 NO HCPCS: Performed by: ORTHOPAEDIC SURGERY

## 2025-03-04 PROCEDURE — 3700000002 HC GENERAL ANESTHESIA TIME - EACH INCREMENTAL 1 MINUTE: Performed by: ORTHOPAEDIC SURGERY

## 2025-03-04 PROCEDURE — 97161 PT EVAL LOW COMPLEX 20 MIN: CPT | Mod: GP | Performed by: PHYSICAL THERAPIST

## 2025-03-04 PROCEDURE — 7100000011 HC EXTENDED STAY RECOVERY HOURLY - NURSING UNIT

## 2025-03-04 PROCEDURE — 3600000017 HC OR TIME - EACH INCREMENTAL 1 MINUTE - PROCEDURE LEVEL SIX: Performed by: ORTHOPAEDIC SURGERY

## 2025-03-04 PROCEDURE — 3600000018 HC OR TIME - INITIAL BASE CHARGE - PROCEDURE LEVEL SIX: Performed by: ORTHOPAEDIC SURGERY

## 2025-03-04 PROCEDURE — 97530 THERAPEUTIC ACTIVITIES: CPT | Mod: GP | Performed by: PHYSICAL THERAPIST

## 2025-03-04 DEVICE — 6.5MM LOW PROFILE HEX SCREW 25MM
Type: IMPLANTABLE DEVICE | Site: HIP | Status: FUNCTIONAL
Brand: TRIDENT II

## 2025-03-04 DEVICE — TRIDENT X3 0 DEGREE POLYETHYLENE INSERT
Type: IMPLANTABLE DEVICE | Site: HIP | Status: FUNCTIONAL
Brand: TRIDENT X3 INSERT

## 2025-03-04 DEVICE — TRIDENT II TRITANIUM CLUSTER 54E
Type: IMPLANTABLE DEVICE | Site: HIP | Status: FUNCTIONAL
Brand: TRIDENT II

## 2025-03-04 DEVICE — CERAMIC V40 FEMORAL HEAD
Type: IMPLANTABLE DEVICE | Site: HIP | Status: FUNCTIONAL
Brand: BIOLOX

## 2025-03-04 DEVICE — HIP STEM - HIGH OFFSET
Type: IMPLANTABLE DEVICE | Site: HIP | Status: FUNCTIONAL
Brand: INSIGNIA

## 2025-03-04 RX ORDER — CYCLOBENZAPRINE HCL 10 MG
10 TABLET ORAL 3 TIMES DAILY PRN
Status: DISCONTINUED | OUTPATIENT
Start: 2025-03-04 | End: 2025-03-06 | Stop reason: HOSPADM

## 2025-03-04 RX ORDER — NEOSTIGMINE METHYLSULFATE 1 MG/ML
INJECTION INTRAVENOUS AS NEEDED
Status: DISCONTINUED | OUTPATIENT
Start: 2025-03-04 | End: 2025-03-04

## 2025-03-04 RX ORDER — LIDOCAINE HYDROCHLORIDE 20 MG/ML
INJECTION, SOLUTION INFILTRATION; PERINEURAL AS NEEDED
Status: DISCONTINUED | OUTPATIENT
Start: 2025-03-04 | End: 2025-03-04

## 2025-03-04 RX ORDER — SODIUM CHLORIDE, SODIUM LACTATE, POTASSIUM CHLORIDE, CALCIUM CHLORIDE 600; 310; 30; 20 MG/100ML; MG/100ML; MG/100ML; MG/100ML
125 INJECTION, SOLUTION INTRAVENOUS CONTINUOUS
Status: ACTIVE | OUTPATIENT
Start: 2025-03-04 | End: 2025-03-04

## 2025-03-04 RX ORDER — TRANEXAMIC ACID 100 MG/ML
INJECTION, SOLUTION INTRAVENOUS AS NEEDED
Status: DISCONTINUED | OUTPATIENT
Start: 2025-03-04 | End: 2025-03-04

## 2025-03-04 RX ORDER — KETOROLAC TROMETHAMINE 15 MG/ML
15 INJECTION, SOLUTION INTRAMUSCULAR; INTRAVENOUS EVERY 6 HOURS
Status: DISCONTINUED | OUTPATIENT
Start: 2025-03-04 | End: 2025-03-05

## 2025-03-04 RX ORDER — CITALOPRAM 20 MG/1
10 TABLET, FILM COATED ORAL DAILY
Status: DISCONTINUED | OUTPATIENT
Start: 2025-03-05 | End: 2025-03-06 | Stop reason: HOSPADM

## 2025-03-04 RX ORDER — OXYCODONE HYDROCHLORIDE 10 MG/1
10 TABLET ORAL EVERY 4 HOURS PRN
Status: DISCONTINUED | OUTPATIENT
Start: 2025-03-04 | End: 2025-03-04

## 2025-03-04 RX ORDER — ACETAMINOPHEN 325 MG/1
975 TABLET ORAL ONCE
Status: DISCONTINUED | OUTPATIENT
Start: 2025-03-04 | End: 2025-03-04 | Stop reason: HOSPADM

## 2025-03-04 RX ORDER — ACETAMINOPHEN 325 MG/1
325 TABLET ORAL EVERY 6 HOURS SCHEDULED
Status: DISCONTINUED | OUTPATIENT
Start: 2025-03-05 | End: 2025-03-05

## 2025-03-04 RX ORDER — OXYCODONE HYDROCHLORIDE 5 MG/1
5 TABLET ORAL EVERY 6 HOURS PRN
Status: DISCONTINUED | OUTPATIENT
Start: 2025-03-04 | End: 2025-03-04

## 2025-03-04 RX ORDER — PROPOFOL 10 MG/ML
INJECTION, EMULSION INTRAVENOUS AS NEEDED
Status: DISCONTINUED | OUTPATIENT
Start: 2025-03-04 | End: 2025-03-04

## 2025-03-04 RX ORDER — ACETAMINOPHEN 325 MG/1
650 TABLET ORAL EVERY 6 HOURS SCHEDULED
Status: DISCONTINUED | OUTPATIENT
Start: 2025-03-04 | End: 2025-03-04

## 2025-03-04 RX ORDER — METOPROLOL TARTRATE 1 MG/ML
2.5 INJECTION, SOLUTION INTRAVENOUS EVERY 30 MIN PRN
Status: DISCONTINUED | OUTPATIENT
Start: 2025-03-04 | End: 2025-03-04 | Stop reason: HOSPADM

## 2025-03-04 RX ORDER — MIDAZOLAM HYDROCHLORIDE 1 MG/ML
INJECTION, SOLUTION INTRAMUSCULAR; INTRAVENOUS AS NEEDED
Status: DISCONTINUED | OUTPATIENT
Start: 2025-03-04 | End: 2025-03-04

## 2025-03-04 RX ORDER — ONDANSETRON 4 MG/1
4 TABLET, ORALLY DISINTEGRATING ORAL EVERY 8 HOURS PRN
Status: DISCONTINUED | OUTPATIENT
Start: 2025-03-04 | End: 2025-03-06 | Stop reason: HOSPADM

## 2025-03-04 RX ORDER — OXYCODONE AND ACETAMINOPHEN 5; 325 MG/1; MG/1
0.5 TABLET ORAL EVERY 4 HOURS PRN
Status: DISCONTINUED | OUTPATIENT
Start: 2025-03-04 | End: 2025-03-04

## 2025-03-04 RX ORDER — POLYETHYLENE GLYCOL 3350 17 G/17G
17 POWDER, FOR SOLUTION ORAL DAILY
Status: DISCONTINUED | OUTPATIENT
Start: 2025-03-04 | End: 2025-03-06 | Stop reason: HOSPADM

## 2025-03-04 RX ORDER — OXYCODONE HYDROCHLORIDE 5 MG/1
5 TABLET ORAL EVERY 4 HOURS PRN
Status: DISCONTINUED | OUTPATIENT
Start: 2025-03-04 | End: 2025-03-04

## 2025-03-04 RX ORDER — SODIUM CHLORIDE, SODIUM LACTATE, POTASSIUM CHLORIDE, CALCIUM CHLORIDE 600; 310; 30; 20 MG/100ML; MG/100ML; MG/100ML; MG/100ML
50 INJECTION, SOLUTION INTRAVENOUS CONTINUOUS
Status: ACTIVE | OUTPATIENT
Start: 2025-03-04 | End: 2025-03-05

## 2025-03-04 RX ORDER — GLYCOPYRROLATE 0.2 MG/ML
INJECTION INTRAMUSCULAR; INTRAVENOUS AS NEEDED
Status: DISCONTINUED | OUTPATIENT
Start: 2025-03-04 | End: 2025-03-04

## 2025-03-04 RX ORDER — CEFAZOLIN SODIUM 2 G/100ML
2 INJECTION, SOLUTION INTRAVENOUS ONCE
Status: COMPLETED | OUTPATIENT
Start: 2025-03-04 | End: 2025-03-04

## 2025-03-04 RX ORDER — FENTANYL CITRATE 50 UG/ML
INJECTION, SOLUTION INTRAMUSCULAR; INTRAVENOUS AS NEEDED
Status: DISCONTINUED | OUTPATIENT
Start: 2025-03-04 | End: 2025-03-04

## 2025-03-04 RX ORDER — ATORVASTATIN CALCIUM 10 MG/1
10 TABLET, FILM COATED ORAL DAILY
Status: DISCONTINUED | OUTPATIENT
Start: 2025-03-04 | End: 2025-03-06 | Stop reason: HOSPADM

## 2025-03-04 RX ORDER — PHENAZOPYRIDINE HYDROCHLORIDE 100 MG/1
200 TABLET, FILM COATED ORAL ONCE AS NEEDED
Status: DISCONTINUED | OUTPATIENT
Start: 2025-03-04 | End: 2025-03-04 | Stop reason: HOSPADM

## 2025-03-04 RX ORDER — ACETAMINOPHEN 325 MG/1
650 TABLET ORAL EVERY 4 HOURS PRN
Status: DISCONTINUED | OUTPATIENT
Start: 2025-03-04 | End: 2025-03-04 | Stop reason: HOSPADM

## 2025-03-04 RX ORDER — NALOXONE HYDROCHLORIDE 0.4 MG/ML
0.2 INJECTION, SOLUTION INTRAMUSCULAR; INTRAVENOUS; SUBCUTANEOUS EVERY 5 MIN PRN
Status: DISCONTINUED | OUTPATIENT
Start: 2025-03-04 | End: 2025-03-06 | Stop reason: HOSPADM

## 2025-03-04 RX ORDER — HYDROMORPHONE HYDROCHLORIDE 1 MG/ML
1 INJECTION, SOLUTION INTRAMUSCULAR; INTRAVENOUS; SUBCUTANEOUS EVERY 5 MIN PRN
Status: DISCONTINUED | OUTPATIENT
Start: 2025-03-04 | End: 2025-03-04 | Stop reason: HOSPADM

## 2025-03-04 RX ORDER — ROCURONIUM BROMIDE 10 MG/ML
INJECTION, SOLUTION INTRAVENOUS AS NEEDED
Status: DISCONTINUED | OUTPATIENT
Start: 2025-03-04 | End: 2025-03-04

## 2025-03-04 RX ORDER — ONDANSETRON HYDROCHLORIDE 2 MG/ML
4 INJECTION, SOLUTION INTRAVENOUS EVERY 8 HOURS PRN
Status: DISCONTINUED | OUTPATIENT
Start: 2025-03-04 | End: 2025-03-06 | Stop reason: HOSPADM

## 2025-03-04 RX ORDER — PHENYLEPHRINE HYDROCHLORIDE 10 MG/ML
INJECTION INTRAVENOUS AS NEEDED
Status: DISCONTINUED | OUTPATIENT
Start: 2025-03-04 | End: 2025-03-04

## 2025-03-04 RX ORDER — CEFAZOLIN SODIUM 2 G/100ML
2 INJECTION, SOLUTION INTRAVENOUS EVERY 8 HOURS
Status: COMPLETED | OUTPATIENT
Start: 2025-03-04 | End: 2025-03-05

## 2025-03-04 RX ORDER — ONDANSETRON HYDROCHLORIDE 2 MG/ML
4 INJECTION, SOLUTION INTRAVENOUS ONCE AS NEEDED
Status: DISCONTINUED | OUTPATIENT
Start: 2025-03-04 | End: 2025-03-04 | Stop reason: HOSPADM

## 2025-03-04 RX ORDER — ALBUTEROL SULFATE 0.83 MG/ML
2.5 SOLUTION RESPIRATORY (INHALATION) ONCE AS NEEDED
Status: DISCONTINUED | OUTPATIENT
Start: 2025-03-04 | End: 2025-03-04 | Stop reason: HOSPADM

## 2025-03-04 RX ORDER — BISACODYL 10 MG/1
10 SUPPOSITORY RECTAL DAILY PRN
Status: DISCONTINUED | OUTPATIENT
Start: 2025-03-04 | End: 2025-03-06 | Stop reason: HOSPADM

## 2025-03-04 RX ORDER — HYDROMORPHONE HYDROCHLORIDE 0.2 MG/ML
0.1 INJECTION INTRAMUSCULAR; INTRAVENOUS; SUBCUTANEOUS EVERY 5 MIN PRN
Status: DISCONTINUED | OUTPATIENT
Start: 2025-03-04 | End: 2025-03-04 | Stop reason: HOSPADM

## 2025-03-04 RX ORDER — FERROUS SULFATE 325(65) MG
65 TABLET ORAL EVERY OTHER DAY
Status: DISCONTINUED | OUTPATIENT
Start: 2025-03-05 | End: 2025-03-06 | Stop reason: HOSPADM

## 2025-03-04 RX ORDER — ROPIVACAINE/EPI/CLONIDINE/KET 2.46-0.005
SYRINGE (ML) INJECTION AS NEEDED
Status: DISCONTINUED | OUTPATIENT
Start: 2025-03-04 | End: 2025-03-04 | Stop reason: HOSPADM

## 2025-03-04 RX ORDER — BISACODYL 5 MG
10 TABLET, DELAYED RELEASE (ENTERIC COATED) ORAL DAILY PRN
Status: DISCONTINUED | OUTPATIENT
Start: 2025-03-04 | End: 2025-03-06 | Stop reason: HOSPADM

## 2025-03-04 RX ORDER — OXYCODONE HYDROCHLORIDE 10 MG/1
10 TABLET ORAL EVERY 4 HOURS PRN
Status: DISCONTINUED | OUTPATIENT
Start: 2025-03-04 | End: 2025-03-04 | Stop reason: HOSPADM

## 2025-03-04 RX ORDER — HYDROCODONE BITARTRATE AND ACETAMINOPHEN 10; 325 MG/1; MG/1
1 TABLET ORAL EVERY 4 HOURS PRN
Status: DISCONTINUED | OUTPATIENT
Start: 2025-03-04 | End: 2025-03-05

## 2025-03-04 RX ORDER — HYDROCODONE BITARTRATE AND ACETAMINOPHEN 5; 325 MG/1; MG/1
1 TABLET ORAL EVERY 4 HOURS PRN
Status: DISCONTINUED | OUTPATIENT
Start: 2025-03-04 | End: 2025-03-05

## 2025-03-04 RX ORDER — HYDROMORPHONE HYDROCHLORIDE 1 MG/ML
INJECTION, SOLUTION INTRAMUSCULAR; INTRAVENOUS; SUBCUTANEOUS AS NEEDED
Status: DISCONTINUED | OUTPATIENT
Start: 2025-03-04 | End: 2025-03-04

## 2025-03-04 RX ORDER — OXYCODONE AND ACETAMINOPHEN 5; 325 MG/1; MG/1
1 TABLET ORAL EVERY 4 HOURS PRN
Status: DISCONTINUED | OUTPATIENT
Start: 2025-03-04 | End: 2025-03-04 | Stop reason: HOSPADM

## 2025-03-04 RX ORDER — ONDANSETRON HYDROCHLORIDE 2 MG/ML
INJECTION, SOLUTION INTRAVENOUS AS NEEDED
Status: DISCONTINUED | OUTPATIENT
Start: 2025-03-04 | End: 2025-03-04

## 2025-03-04 RX ORDER — LEVOTHYROXINE SODIUM 100 UG/1
100 TABLET ORAL DAILY
Status: DISCONTINUED | OUTPATIENT
Start: 2025-03-05 | End: 2025-03-06 | Stop reason: HOSPADM

## 2025-03-04 RX ORDER — HYDRALAZINE HYDROCHLORIDE 20 MG/ML
5 INJECTION INTRAMUSCULAR; INTRAVENOUS EVERY 30 MIN PRN
Status: DISCONTINUED | OUTPATIENT
Start: 2025-03-04 | End: 2025-03-04 | Stop reason: HOSPADM

## 2025-03-04 RX ORDER — ASPIRIN 81 MG/1
81 TABLET ORAL 2 TIMES DAILY
Status: DISCONTINUED | OUTPATIENT
Start: 2025-03-05 | End: 2025-03-06 | Stop reason: HOSPADM

## 2025-03-04 RX ADMIN — Medication 8 L/MIN: at 13:43

## 2025-03-04 RX ADMIN — ACETAMINOPHEN 650 MG: 325 TABLET ORAL at 19:50

## 2025-03-04 RX ADMIN — CEFAZOLIN SODIUM 2 G: 2 INJECTION, SOLUTION INTRAVENOUS at 11:16

## 2025-03-04 RX ADMIN — Medication 3 L/MIN: at 13:45

## 2025-03-04 RX ADMIN — SODIUM CHLORIDE, POTASSIUM CHLORIDE, SODIUM LACTATE AND CALCIUM CHLORIDE: 600; 310; 30; 20 INJECTION, SOLUTION INTRAVENOUS at 11:01

## 2025-03-04 RX ADMIN — OXYCODONE HYDROCHLORIDE 10 MG: 10 TABLET ORAL at 21:05

## 2025-03-04 RX ADMIN — MIDAZOLAM 2 MG: 1 INJECTION INTRAMUSCULAR; INTRAVENOUS at 11:01

## 2025-03-04 RX ADMIN — DEXAMETHASONE SODIUM PHOSPHATE 10 MG: 4 INJECTION, SOLUTION INTRAMUSCULAR; INTRAVENOUS at 11:28

## 2025-03-04 RX ADMIN — FENTANYL CITRATE 50 MCG: 50 INJECTION, SOLUTION INTRAMUSCULAR; INTRAVENOUS at 11:59

## 2025-03-04 RX ADMIN — SODIUM CHLORIDE, POTASSIUM CHLORIDE, SODIUM LACTATE AND CALCIUM CHLORIDE 50 ML/HR: 600; 310; 30; 20 INJECTION, SOLUTION INTRAVENOUS at 17:15

## 2025-03-04 RX ADMIN — LIDOCAINE HYDROCHLORIDE 60 MG: 20 INJECTION, SOLUTION INFILTRATION; PERINEURAL at 11:10

## 2025-03-04 RX ADMIN — CEFAZOLIN SODIUM 2 G: 2 INJECTION, SOLUTION INTRAVENOUS at 19:50

## 2025-03-04 RX ADMIN — FENTANYL CITRATE 50 MCG: 50 INJECTION, SOLUTION INTRAMUSCULAR; INTRAVENOUS at 11:10

## 2025-03-04 RX ADMIN — NEOSTIGMINE METHYLSULFATE 5 MG: 1 INJECTION INTRAVENOUS at 13:09

## 2025-03-04 RX ADMIN — KETOROLAC TROMETHAMINE 15 MG: 15 INJECTION, SOLUTION INTRAMUSCULAR; INTRAVENOUS at 22:33

## 2025-03-04 RX ADMIN — HYDROMORPHONE HYDROCHLORIDE 0.5 MG: 1 INJECTION, SOLUTION INTRAMUSCULAR; INTRAVENOUS; SUBCUTANEOUS at 14:09

## 2025-03-04 RX ADMIN — ROCURONIUM BROMIDE 50 MG: 10 INJECTION INTRAVENOUS at 11:10

## 2025-03-04 RX ADMIN — CEFAZOLIN SODIUM 1 G: 2 INJECTION, SOLUTION INTRAVENOUS at 11:28

## 2025-03-04 RX ADMIN — KETOROLAC TROMETHAMINE 15 MG: 15 INJECTION, SOLUTION INTRAMUSCULAR; INTRAVENOUS at 17:13

## 2025-03-04 RX ADMIN — HYDROMORPHONE HYDROCHLORIDE 0.5 MG: 1 INJECTION, SOLUTION INTRAMUSCULAR; INTRAVENOUS; SUBCUTANEOUS at 14:19

## 2025-03-04 RX ADMIN — ROCURONIUM BROMIDE 20 MG: 10 INJECTION INTRAVENOUS at 11:41

## 2025-03-04 RX ADMIN — TRANEXAMIC ACID 1000 MG: 1 INJECTION, SOLUTION INTRAVENOUS at 12:58

## 2025-03-04 RX ADMIN — PROPOFOL 130 MG: 10 INJECTION, EMULSION INTRAVENOUS at 11:10

## 2025-03-04 RX ADMIN — ONDANSETRON 4 MG: 2 INJECTION INTRAMUSCULAR; INTRAVENOUS at 12:58

## 2025-03-04 RX ADMIN — ONDANSETRON 4 MG: 2 INJECTION, SOLUTION INTRAMUSCULAR; INTRAVENOUS at 17:52

## 2025-03-04 RX ADMIN — Medication 2 L/MIN: at 16:24

## 2025-03-04 RX ADMIN — PHENYLEPHRINE HYDROCHLORIDE 100 MCG: 10 INJECTION INTRAVENOUS at 11:27

## 2025-03-04 RX ADMIN — GLYCOPYRROLATE 0.06 MG: 0.2 INJECTION INTRAMUSCULAR; INTRAVENOUS at 13:09

## 2025-03-04 RX ADMIN — SODIUM CHLORIDE, POTASSIUM CHLORIDE, SODIUM LACTATE AND CALCIUM CHLORIDE 50 ML/HR: 600; 310; 30; 20 INJECTION, SOLUTION INTRAVENOUS at 19:49

## 2025-03-04 RX ADMIN — HYDROMORPHONE HYDROCHLORIDE 0.5 MG: 1 INJECTION, SOLUTION INTRAMUSCULAR; INTRAVENOUS; SUBCUTANEOUS at 11:59

## 2025-03-04 RX ADMIN — TRANEXAMIC ACID 1000 MG: 1 INJECTION, SOLUTION INTRAVENOUS at 11:28

## 2025-03-04 RX ADMIN — ATORVASTATIN CALCIUM 10 MG: 10 TABLET, FILM COATED ORAL at 21:05

## 2025-03-04 SDOH — HEALTH STABILITY: MENTAL HEALTH: CURRENT SMOKER: 0

## 2025-03-04 SDOH — SOCIAL STABILITY: SOCIAL INSECURITY
WITHIN THE LAST YEAR, HAVE YOU BEEN HUMILIATED OR EMOTIONALLY ABUSED IN OTHER WAYS BY YOUR PARTNER OR EX-PARTNER?: PATIENT DECLINED

## 2025-03-04 SDOH — SOCIAL STABILITY: SOCIAL INSECURITY: ABUSE: ADULT

## 2025-03-04 SDOH — ECONOMIC STABILITY: FOOD INSECURITY
WITHIN THE PAST 12 MONTHS, YOU WORRIED THAT YOUR FOOD WOULD RUN OUT BEFORE YOU GOT THE MONEY TO BUY MORE.: PATIENT DECLINED

## 2025-03-04 SDOH — SOCIAL STABILITY: SOCIAL INSECURITY: HAVE YOU HAD ANY THOUGHTS OF HARMING ANYONE ELSE?: NO

## 2025-03-04 SDOH — SOCIAL STABILITY: SOCIAL INSECURITY
WITHIN THE LAST YEAR, HAVE YOU BEEN KICKED, HIT, SLAPPED, OR OTHERWISE PHYSICALLY HURT BY YOUR PARTNER OR EX-PARTNER?: PATIENT DECLINED

## 2025-03-04 SDOH — SOCIAL STABILITY: SOCIAL INSECURITY: ARE THERE ANY APPARENT SIGNS OF INJURIES/BEHAVIORS THAT COULD BE RELATED TO ABUSE/NEGLECT?: NO

## 2025-03-04 SDOH — ECONOMIC STABILITY: FOOD INSECURITY: WITHIN THE PAST 12 MONTHS, THE FOOD YOU BOUGHT JUST DIDN'T LAST AND YOU DIDN'T HAVE MONEY TO GET MORE.: PATIENT DECLINED

## 2025-03-04 SDOH — SOCIAL STABILITY: SOCIAL INSECURITY: ARE YOU OR HAVE YOU BEEN THREATENED OR ABUSED PHYSICALLY, EMOTIONALLY, OR SEXUALLY BY ANYONE?: NO

## 2025-03-04 SDOH — SOCIAL STABILITY: SOCIAL INSECURITY: HAS ANYONE EVER THREATENED TO HURT YOUR FAMILY OR YOUR PETS?: NO

## 2025-03-04 SDOH — SOCIAL STABILITY: SOCIAL INSECURITY: WERE YOU ABLE TO COMPLETE ALL THE BEHAVIORAL HEALTH SCREENINGS?: NO

## 2025-03-04 SDOH — ECONOMIC STABILITY: INCOME INSECURITY
IN THE PAST 12 MONTHS HAS THE ELECTRIC, GAS, OIL, OR WATER COMPANY THREATENED TO SHUT OFF SERVICES IN YOUR HOME?: PATIENT DECLINED

## 2025-03-04 SDOH — SOCIAL STABILITY: SOCIAL INSECURITY: HAVE YOU HAD THOUGHTS OF HARMING ANYONE ELSE?: NO

## 2025-03-04 SDOH — SOCIAL STABILITY: SOCIAL INSECURITY
WITHIN THE LAST YEAR, HAVE YOU BEEN RAPED OR FORCED TO HAVE ANY KIND OF SEXUAL ACTIVITY BY YOUR PARTNER OR EX-PARTNER?: PATIENT DECLINED

## 2025-03-04 SDOH — SOCIAL STABILITY: SOCIAL INSECURITY: WITHIN THE LAST YEAR, HAVE YOU BEEN AFRAID OF YOUR PARTNER OR EX-PARTNER?: PATIENT DECLINED

## 2025-03-04 SDOH — SOCIAL STABILITY: SOCIAL INSECURITY: DO YOU FEEL ANYONE HAS EXPLOITED OR TAKEN ADVANTAGE OF YOU FINANCIALLY OR OF YOUR PERSONAL PROPERTY?: NO

## 2025-03-04 SDOH — SOCIAL STABILITY: SOCIAL INSECURITY: DO YOU FEEL UNSAFE GOING BACK TO THE PLACE WHERE YOU ARE LIVING?: NO

## 2025-03-04 SDOH — SOCIAL STABILITY: SOCIAL INSECURITY: DOES ANYONE TRY TO KEEP YOU FROM HAVING/CONTACTING OTHER FRIENDS OR DOING THINGS OUTSIDE YOUR HOME?: NO

## 2025-03-04 ASSESSMENT — COGNITIVE AND FUNCTIONAL STATUS - GENERAL
MOVING TO AND FROM BED TO CHAIR: A LITTLE
CLIMB 3 TO 5 STEPS WITH RAILING: A LITTLE
CLIMB 3 TO 5 STEPS WITH RAILING: A LITTLE
STANDING UP FROM CHAIR USING ARMS: A LITTLE
DAILY ACTIVITIY SCORE: 18
MOVING TO AND FROM BED TO CHAIR: A LITTLE
EATING MEALS: A LITTLE
STANDING UP FROM CHAIR USING ARMS: A LITTLE
TOILETING: A LITTLE
DRESSING REGULAR LOWER BODY CLOTHING: A LITTLE
MOVING FROM LYING ON BACK TO SITTING ON SIDE OF FLAT BED WITH BEDRAILS: A LITTLE
TURNING FROM BACK TO SIDE WHILE IN FLAT BAD: A LITTLE
CLIMB 3 TO 5 STEPS WITH RAILING: A LOT
STANDING UP FROM CHAIR USING ARMS: A LITTLE
MOVING TO AND FROM BED TO CHAIR: A LITTLE
MOBILITY SCORE: 20
DRESSING REGULAR UPPER BODY CLOTHING: A LITTLE
HELP NEEDED FOR BATHING: A LITTLE
PATIENT BASELINE BEDBOUND: NO
WALKING IN HOSPITAL ROOM: A LITTLE
WALKING IN HOSPITAL ROOM: A LOT
MOBILITY SCORE: 16
WALKING IN HOSPITAL ROOM: A LITTLE
PERSONAL GROOMING: A LITTLE
MOBILITY SCORE: 20

## 2025-03-04 ASSESSMENT — PAIN - FUNCTIONAL ASSESSMENT

## 2025-03-04 ASSESSMENT — PAIN SCALES - GENERAL
PAINLEVEL_OUTOF10: 4
PAINLEVEL_OUTOF10: 5 - MODERATE PAIN
PAINLEVEL_OUTOF10: 7
PAINLEVEL_OUTOF10: 3
PAINLEVEL_OUTOF10: 0 - NO PAIN
PAINLEVEL_OUTOF10: 5 - MODERATE PAIN
PAINLEVEL_OUTOF10: 5 - MODERATE PAIN
PAINLEVEL_OUTOF10: 0 - NO PAIN
PAINLEVEL_OUTOF10: 4
PAINLEVEL_OUTOF10: 5 - MODERATE PAIN

## 2025-03-04 ASSESSMENT — LIFESTYLE VARIABLES
AUDIT-C TOTAL SCORE: -1
HOW OFTEN DO YOU HAVE A DRINK CONTAINING ALCOHOL: PATIENT DECLINED
SKIP TO QUESTIONS 9-10: 0
HOW OFTEN DO YOU HAVE 6 OR MORE DRINKS ON ONE OCCASION: PATIENT DECLINED
AUDIT-C TOTAL SCORE: -1
HOW MANY STANDARD DRINKS CONTAINING ALCOHOL DO YOU HAVE ON A TYPICAL DAY: PATIENT DECLINED

## 2025-03-04 ASSESSMENT — PAIN DESCRIPTION - ORIENTATION
ORIENTATION: RIGHT

## 2025-03-04 ASSESSMENT — ACTIVITIES OF DAILY LIVING (ADL)
HEARING - LEFT EAR: FUNCTIONAL
LACK_OF_TRANSPORTATION: PATIENT DECLINED
PATIENT'S MEMORY ADEQUATE TO SAFELY COMPLETE DAILY ACTIVITIES?: YES
HEARING - RIGHT EAR: FUNCTIONAL
ADEQUATE_TO_COMPLETE_ADL: YES
TOILETING: INDEPENDENT
DRESSING YOURSELF: INDEPENDENT
WALKS IN HOME: INDEPENDENT
LACK_OF_TRANSPORTATION: PATIENT DECLINED
BATHING: INDEPENDENT
JUDGMENT_ADEQUATE_SAFELY_COMPLETE_DAILY_ACTIVITIES: YES
FEEDING YOURSELF: INDEPENDENT
GROOMING: INDEPENDENT

## 2025-03-04 ASSESSMENT — COLUMBIA-SUICIDE SEVERITY RATING SCALE - C-SSRS
6. HAVE YOU EVER DONE ANYTHING, STARTED TO DO ANYTHING, OR PREPARED TO DO ANYTHING TO END YOUR LIFE?: NO
2. HAVE YOU ACTUALLY HAD ANY THOUGHTS OF KILLING YOURSELF?: NO
1. IN THE PAST MONTH, HAVE YOU WISHED YOU WERE DEAD OR WISHED YOU COULD GO TO SLEEP AND NOT WAKE UP?: NO

## 2025-03-04 ASSESSMENT — PAIN DESCRIPTION - LOCATION
LOCATION: HIP

## 2025-03-04 ASSESSMENT — PAIN DESCRIPTION - DESCRIPTORS
DESCRIPTORS: BURNING

## 2025-03-04 ASSESSMENT — PAIN SCALES - PAIN ASSESSMENT IN ADVANCED DEMENTIA (PAINAD)
TOTALSCORE: COLD APPLIED
TOTALSCORE: MEDICATION (SEE MAR)
TOTALSCORE: MEDICATION (SEE MAR)

## 2025-03-04 ASSESSMENT — PATIENT HEALTH QUESTIONNAIRE - PHQ9
SUM OF ALL RESPONSES TO PHQ9 QUESTIONS 1 & 2: 0
1. LITTLE INTEREST OR PLEASURE IN DOING THINGS: NOT AT ALL
2. FEELING DOWN, DEPRESSED OR HOPELESS: NOT AT ALL

## 2025-03-04 NOTE — ANESTHESIA POSTPROCEDURE EVALUATION
Patient: Lila Gay    Procedure Summary       Date: 03/04/25 Room / Location: [facility] OR 10 / Virtual [facility] OR    Anesthesia Start: 1101 Anesthesia Stop: 1346    Procedure: Right anterior total hip replacement (Right: Hip) Diagnosis:       Unilateral primary osteoarthritis, right hip      (M16.11)    Surgeons: Bill Song MD Responsible Provider: Stiven Russell MD    Anesthesia Type: general ASA Status: 3            Anesthesia Type: general    Vitals Value Taken Time   /68 03/04/25 1343   Temp 36.3 °C (97.3 °F) 03/04/25 1343   Pulse 52 03/04/25 1345   Resp 14 03/04/25 1345   SpO2 98 % 03/04/25 1345   Vitals shown include unfiled device data.    Anesthesia Post Evaluation    Patient location during evaluation: PACU  Patient participation: complete - patient participated  Level of consciousness: awake  Pain management: adequate  Airway patency: patent  Cardiovascular status: acceptable  Respiratory status: acceptable and face mask  Hydration status: acceptable  Postoperative Nausea and Vomiting: none        There were no known notable events for this encounter.

## 2025-03-04 NOTE — DISCHARGE INSTR - OTHER ORDERS
If you have any questions or concerns about your discharge instructions, please call the unit at  and ask to speak with the charge nurse. Thank you for choosing [facility]. It was our pleasure to care for you.

## 2025-03-04 NOTE — ANESTHESIA PROCEDURE NOTES
Airway  Date/Time: 3/4/2025 11:11 AM  Urgency: elective    Airway not difficult    Staffing  Performed: CRNA   Authorized by: Stiven Russell MD    Performed by: NEERAJ Alan-EDDIE  Patient location during procedure: OR    Indications and Patient Condition  Indications for airway management: anesthesia  Spontaneous ventilation: present  Sedation level: deep  Preoxygenated: yes  Patient position: sniffing  MILS maintained throughout  Mask difficulty assessment: 1 - vent by mask    Final Airway Details  Final airway type: endotracheal airway      Successful airway: ETT  Cuffed: yes   Successful intubation technique: direct laryngoscopy  Facilitating devices/methods: intubating stylet  Blade: Keri  Blade size: #3  ETT size (mm): 7.0  Cormack-Lehane Classification: grade I - full view of glottis  Placement verified by: chest auscultation and capnometry   Cuff volume (mL): 6  Measured from: lips  ETT to lips (cm): 22  Number of attempts at approach: 1  Ventilation between attempts: BVM and spontaneous  Number of other approaches attempted: 0

## 2025-03-04 NOTE — DISCHARGE INSTR - ACTIVITY
*Call Dr. Song for any problems and/or concerns.  *Maintain hip precautions  *Weight bearing as tolerated with a wheeled walker  *Remove surgical dressing post-operative day #7 and leave open to air.  *You may shower, do not soak or scrub incision; pat dry  *Apply ice to hip as needed to minimize swelling, on 20 minutes, off 20 minutes  *Continue the coughing and deep breathing exercises that you learned in the hospital  *Move around as much as you can tolerate because movement can help you heal and helps prevent stiffness, skin sores, and blood clots    Call Doctor right away for:  *Increased hip pain  *Pain or swelling in your calf of leg  *Fever above 100.4/shaking chills  *Excessive swelling, increased redness or drainage around the incision  *Your incision breaks open  *Chest pain/shortness of breath, call 911    After the procedure it is common to have pain and swelling.      -To help prevent hip dislocation, follow instructions about movement restrictions as told by your physician:  *Do not cross your legs at the knees. To remind yourself about this, you may keep a pillow between your legs while lying in bed  *Do not bend at the hip and waist or bend farther than 90 degrees of flexion.   To avoid bending this far: do not bring your knees higher than your hips, do not  something from the floor while sitting in a chair, avoid sitting in low chairs, used raised toilet seat (if needed), when standing up from a seated position- keep injured leg out in front of you  *Avoid twisting at your waist and reaching across your body to the side of the affected leg  *Avoid rotating your toes inward on the affected leg    *When getting into a car:  1. Raise the seat as high as possible, move the seat as far back as it will go, and recline the upper part of the seat slightly  2. Sit down on the seat with your injured leg extended out of the car  3. Scoot back in the seat as you move the lower half of your body into  the car. Try to avoid bumping your foot or leg as you bring it into the car. To make this motion smooth and easy on a cloth seat, try placing a plastic bag on the seat    *If your physician has prescribed compression stockings please use as directed. These stockings help to prevent blood clots and reduce swelling in your legs  *Continue to do the exercises you learned in the hospital: i.e. ankle pumps  *If you were prescribed a blood thinner (anticoagulant), take it as prescribed    *Do not use any products that contain nicotine or tobacco, such as cigarettes and e-cigarettes. These can delay bone healing. If you need help quitting, ask your healthcare provider    If you are taking prescription pain medication, take actions to prevent or treat constipation.  -drink enough fluids to keep your urine pale yellow  -eat foods that are high in fiber, such as fresh fruits and vegetables, whole grains, and beans  -limit food that are high in fat and processed sugars, such as fried or sweet foods  -take an over the counter or prescribed medicine for constipation

## 2025-03-04 NOTE — ANESTHESIA PREPROCEDURE EVALUATION
Patient: Lila Gay    Procedure Information       Date/Time: 03/04/25 1050    Procedure: Right anterior total hip replacement (Right: Hip)    Location: [facility] 10 / [facility]    Surgeons: Bill Song MD            Relevant Problems   No relevant active problems       Clinical information reviewed:   Tobacco  Allergies  Meds   Med Hx  Surg Hx   Fam Hx  Soc Hx        NPO Detail:  NPO/Void Status  Date of Last Liquid: 03/04/25  Time of Last Liquid: 0700  Date of Last Solid: 03/03/25  Time of Last Solid: 2000  Time of Last Void: 0900         Physical Exam    Airway  Mallampati: III  TM distance: >3 FB  Neck ROM: full     Cardiovascular   Rhythm: regular  Rate: normal     Dental    Pulmonary   Breath sounds clear to auscultation  (+) decreased breath sounds     Abdominal   (+) obese  Abdomen: soft  Bowel sounds: normal           Anesthesia Plan    History of general anesthesia?: yes  History of complications of general anesthesia?: no    ASA 3     general   (GA-patient's and surgeon's preference.)  The patient is not a current smoker.  Patient was previously instructed to abstain from smoking on day of procedure.  Patient did not smoke on day of procedure.  Education provided regarding risk of obstructive sleep apnea.  intravenous induction   Postoperative administration of opioids is intended.  Anesthetic plan and risks discussed with patient.  Use of blood products discussed with patient who consented to blood products.    Plan discussed with CRNA.

## 2025-03-04 NOTE — OP NOTE
Right anterior total hip replacement (R) Operative Note     Date: 3/4/2025  OR Location: [facility]    Name: Lila Gay, : 1945, Age: 79 y.o., MRN: [id number], Sex: female    Diagnosis  Pre-op Diagnosis      * Unilateral primary osteoarthritis, right hip [M16.11] Post-op Diagnosis     * Unilateral primary osteoarthritis, right hip [M16.11]     Procedures  Right anterior total hip replacement  22138 - PA ARTHRP ACETBLR/PROX FEM PROSTC AGRFT/ALGRFT      Surgeons      * Bill Song - Primary    Resident/Fellow/Other Assistant:  Surgeons and Role:  * No surgeons found with a matching role *    Staff:   Surgical Assistant:   Circulator: Wilder Garcia Person: Herlinda Garcia Person: Dre Garcia Person: Heidi    Anesthesia Staff: Anesthesiologist: Stiven Russell MD  CRNA: NEERAJ Alan-EDDIE    Procedure Summary  Anesthesia: General  ASA: III  Estimated Blood Loss: 300 mL  Intra-op Medications:   Administrations occurring from 1050 to 1330 on 25:   Medication Name Total Dose   ropivacaine-epinephrine-clonidine-ketorolac 2.46-0.005- 0.0008-0.3mg/mL periarticular syringe 50 mL   ceFAZolin (Ancef) 2 g in dextrose (iso)  mL 3 g   dexAMETHasone (Decadron) injection 4 mg/mL 10 mg   fentaNYL (Sublimaze) injection 50 mcg/mL 100 mcg   glycopyrrolate (Robinul) injection 0.06 mg   HYDROmorphone (Dilaudid) injection 1 mg/mL 0.5 mg   LR bolus Cannot be calculated   lidocaine (Xylocaine) injection 2 % 60 mg   midazolam (Versed) injection 1 mg/mL 2 mg   neostigmine (Bloxiverz) 10 mg/10 mL injection 5 mg   ondansetron (Zofran) 2 mg/mL injection 4 mg   phenylephrine (Derrick-Synephrine) injection 100 mcg   propofol (Diprivan) injection 10 mg/mL 130 mg   rocuronium (ZeMuron) 50 mg/5 mL injection 70 mg   tranexamic acid (Cyklokapron) injection 2,000 mg              Anesthesia Record               Intraprocedure I/O Totals          Intake    LR bolus 1200.00 mL    Tranexamic Acid 0.00 mL    The total shown is the  total volume documented since Anesthesia Start was filed.    Total Intake 1200 mL       Output    Urine 0 mL    Est. Blood Loss 150 mL    Total Output 150 mL       Net    Net Volume 1050 mL          Specimen: No specimens collected              Drains and/or Catheters: * None in log *    Tourniquet Times:         Implants:  Implants       Type Name Action Serial No.      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 female who is having surgery for end-stage arthritic change of the right hip.  Patient is failed multiple conservative measures including activity modification and had undergone extensive weight loss in order to safely undergo the procedure.  Patient was no longer able to perform simple daily activities due to increasing pain and discomfort of the right hip and patient was no longer tolerating nonoperative management.  As such recommendation was made for right total hip replacement.  Risk benefits alternatives have been discussed extents with the patient and she was in agreement to proceed.    The patient was seen in the preoperative area. The risks, benefits, complications, treatment options, non-operative alternatives, expected recovery and outcomes were discussed with the patient. The possibilities of reaction to medication, pulmonary aspiration, injury to surrounding structures, bleeding, recurrent infection, the need for additional procedures, failure to diagnose a condition, and creating a complication requiring transfusion or operation were discussed with the patient.  The patient concurred with the proposed plan, giving informed consent.  The site of surgery was properly noted/marked if necessary per policy. The patient has been actively warmed in preoperative area. Preoperative antibiotics have been ordered and given within 1 hours of incision. Venous thrombosis prophylaxis have been ordered including bilateral sequential compression devices and chemical prophylaxis    Procedure Details: Patient was taken the operating room where he was positioned appropriately on the [place] table with both feet in boots and all bony prominences padded appropriately.  Preoperative x-rays were taken including AP hip and AP pelvis to use as a guide for leg lengths and ultimately for implant placement.  The right hip was prepped and draped as typical for extremity procedure.  A timeout was performed, all parties were identified, and the correct surgical site was noted.  At the conclusion of the timeout began by making a standard anterior approach to the hip.  Incision was made through skin exposing subcutaneous tissue.  Dissection was carried down to the tensor fascia alirio.  Tensor fascia alirio was then incised longitudinally in line with the incision.  The lateral femoral cutaneous nerve was protected in the surrounding tissues.  After incising the fascia longitudinally the muscle belly was then swept laterally.  The deep interval was subsequently identified between the rectus and abductors.  In the deep interval we did identify the ascending branch of the lateral femoral circumflex artery this was cauterized and dissection was then carried down to the capsule.  Soft tissue and reflected head of rectus was swept off the capsule.  Retractors were placed on either side of the capsule about the femoral neck.  A capsulectomy was subsequently performed and the femoral head and neck were visualized.  Retractors were subsequently repositioned inside the capsule and a standard neck cut was made.  The femoral  head was subsequently removed and the acetabulum was visualized.  Labrum and surrounding soft tissue remnants about an advanced arthritic acetabulum was subsequently excised.  Retractors were placed about the acetabulum to provide insertion of the reamers.  Subsequently reaming under fluoroscopic guidance was performed up to a size 54 Aldo Trident II Acetabular liner .  This was subsequently inserted without difficulty with good press-fit obtained.  A single screw was added for additional compression and rotational control.  A standard polyethylene liner was placed.  I then turned my attention to the femur.  Soft tissue releases about the femur down to the level of the lesser trochanter from a capsular standpoint as well as releasing off of the majority of the capsular attachments to the greater trochanter were performed.  Short external rotators were kept intact on the posterior aspect of the greater trochanter.  In the process of attempting to mobilize the femur despite all soft tissue releases tension-free rotation of the femur was performed.  At approximately 60 degrees of external rotation there was a palpable snap at the tip of the greater trochanter.  This resulted in avulsion of the superior aspect of the capsular attachments on the tip of the greater trochanter.  No tendons or abductor components were damaged.  I placed a femoral hook elevator into the space between the gluteus vishnu and the greater trochanter to allow for better elevation of the femur.  Subsequently proceeded to drop the leg using the [place] table with external rotation to allow for full femoral exposure.  Exposure was adequate and subsequently broached up to a size 5 Aldo Insignia femoral stem.  This was subsequently trialed with a high offset femoral neck and a size 36 mm diameter -2.5 mm ceramic head.  Fluoroscopic imaging confirmed appropriate offset and leg lengths.  Trials were subsequently removed and final implants were  subsequently seated with excellent rotational control.  Final fluoroscopic imaging was obtained demonstrating appropriately seated implants and adequate leg length.  Wound was copiously irrigated with 3 L of normal saline the deep space was infiltrated with 50 CC of SOPHIE.  Fascia was closed with #1 Vicryl suture and skin was closed with 2-0 Vicryl and 3-0 stratafix.  Patient tolerated the procedure well no unexpected complications occurred.    An assistant was used during this case.  Assistant Dre Correa SA helped in the positioning, prepping, draping, retracting and aiding in my ability to perform critical portions of this case and performed the majority of the closure and dressing application.  Help from the assistant was critical in performing these functions.    Complications: Avulsion of tip of greater trochanter as a result of adhesive capsulitis of the hip.  Disposition: PACU - hemodynamically stable.  Condition: stable       Postoperative plan: Patient did well with surgery overall.  Recommend admission for evaluation from physical therapy and pain control.  Pending patient's improvement with pain control and mobilization we will plan for discharge either late on postoperative day #0 on postoperative day #1.  We will initiate aspirin 81 mg 2 times daily for DVT prophylaxis beginning on postoperative day #1.  This will be continued for 30 days.  Patient to be weightbearing as tolerated on the operative extremity.        Bill Song  Phone Number: [phone]

## 2025-03-05 LAB
ANION GAP SERPL CALC-SCNC: 11 MMOL/L (ref 10–20)
BUN SERPL-MCNC: 29 MG/DL (ref 6–23)
CALCIUM SERPL-MCNC: 8.7 MG/DL (ref 8.6–10.3)
CHLORIDE SERPL-SCNC: 104 MMOL/L (ref 98–107)
CO2 SERPL-SCNC: 24 MMOL/L (ref 21–32)
CREAT SERPL-MCNC: 1.13 MG/DL (ref 0.5–1.05)
EGFRCR SERPLBLD CKD-EPI 2021: 50 ML/MIN/1.73M*2
ERYTHROCYTE [DISTWIDTH] IN BLOOD BY AUTOMATED COUNT: 12.6 % (ref 11.5–14.5)
GLUCOSE SERPL-MCNC: 124 MG/DL (ref 74–99)
HCT VFR BLD AUTO: 32 % (ref 36–46)
HGB BLD-MCNC: 10.2 G/DL (ref 12–16)
MCH RBC QN AUTO: 31.5 PG (ref 26–34)
MCHC RBC AUTO-ENTMCNC: 31.9 G/DL (ref 32–36)
MCV RBC AUTO: 99 FL (ref 80–100)
NRBC BLD-RTO: 0 /100 WBCS (ref 0–0)
PLATELET # BLD AUTO: 188 X10*3/UL (ref 150–450)
POTASSIUM SERPL-SCNC: 4.9 MMOL/L (ref 3.5–5.3)
RBC # BLD AUTO: 3.24 X10*6/UL (ref 4–5.2)
SODIUM SERPL-SCNC: 134 MMOL/L (ref 136–145)
WBC # BLD AUTO: 10.4 X10*3/UL (ref 4.4–11.3)

## 2025-03-05 PROCEDURE — 7100000011 HC EXTENDED STAY RECOVERY HOURLY - NURSING UNIT

## 2025-03-05 PROCEDURE — 2500000002 HC RX 250 W HCPCS SELF ADMINISTERED DRUGS (ALT 637 FOR MEDICARE OP, ALT 636 FOR OP/ED): Performed by: ORTHOPAEDIC SURGERY

## 2025-03-05 PROCEDURE — 97165 OT EVAL LOW COMPLEX 30 MIN: CPT | Mod: GO

## 2025-03-05 PROCEDURE — 97535 SELF CARE MNGMENT TRAINING: CPT | Mod: GO

## 2025-03-05 PROCEDURE — 2500000004 HC RX 250 GENERAL PHARMACY W/ HCPCS (ALT 636 FOR OP/ED): Performed by: PHYSICIAN ASSISTANT

## 2025-03-05 PROCEDURE — 97110 THERAPEUTIC EXERCISES: CPT | Mod: GP,CQ

## 2025-03-05 PROCEDURE — 97530 THERAPEUTIC ACTIVITIES: CPT | Mod: GP,CQ

## 2025-03-05 PROCEDURE — 36415 COLL VENOUS BLD VENIPUNCTURE: CPT | Performed by: ORTHOPAEDIC SURGERY

## 2025-03-05 PROCEDURE — 97116 GAIT TRAINING THERAPY: CPT | Mod: GP,CQ

## 2025-03-05 PROCEDURE — 85027 COMPLETE CBC AUTOMATED: CPT | Performed by: ORTHOPAEDIC SURGERY

## 2025-03-05 PROCEDURE — 2500000001 HC RX 250 WO HCPCS SELF ADMINISTERED DRUGS (ALT 637 FOR MEDICARE OP): Performed by: PHYSICIAN ASSISTANT

## 2025-03-05 PROCEDURE — 82374 ASSAY BLOOD CARBON DIOXIDE: CPT | Performed by: ORTHOPAEDIC SURGERY

## 2025-03-05 PROCEDURE — 2500000004 HC RX 250 GENERAL PHARMACY W/ HCPCS (ALT 636 FOR OP/ED): Performed by: ORTHOPAEDIC SURGERY

## 2025-03-05 PROCEDURE — 2500000001 HC RX 250 WO HCPCS SELF ADMINISTERED DRUGS (ALT 637 FOR MEDICARE OP)

## 2025-03-05 PROCEDURE — 2500000001 HC RX 250 WO HCPCS SELF ADMINISTERED DRUGS (ALT 637 FOR MEDICARE OP): Performed by: ORTHOPAEDIC SURGERY

## 2025-03-05 PROCEDURE — 99231 SBSQ HOSP IP/OBS SF/LOW 25: CPT | Performed by: PHYSICIAN ASSISTANT

## 2025-03-05 RX ORDER — ACETAMINOPHEN 325 MG/1
325 TABLET ORAL EVERY 8 HOURS
Status: DISCONTINUED | OUTPATIENT
Start: 2025-03-05 | End: 2025-03-06 | Stop reason: HOSPADM

## 2025-03-05 RX ORDER — HYDROCODONE BITARTRATE AND ACETAMINOPHEN 5; 325 MG/1; MG/1
0.5 TABLET ORAL EVERY 4 HOURS PRN
Status: DISCONTINUED | OUTPATIENT
Start: 2025-03-05 | End: 2025-03-06 | Stop reason: HOSPADM

## 2025-03-05 RX ORDER — HYDROCODONE BITARTRATE AND ACETAMINOPHEN 5; 325 MG/1; MG/1
1 TABLET ORAL EVERY 4 HOURS PRN
Status: DISCONTINUED | OUTPATIENT
Start: 2025-03-05 | End: 2025-03-06 | Stop reason: HOSPADM

## 2025-03-05 RX ORDER — MELOXICAM 7.5 MG/1
7.5 TABLET ORAL DAILY
Status: DISCONTINUED | OUTPATIENT
Start: 2025-03-05 | End: 2025-03-06 | Stop reason: HOSPADM

## 2025-03-05 RX ORDER — SCOPOLAMINE 1 MG/3D
1 PATCH, EXTENDED RELEASE TRANSDERMAL
Status: DISCONTINUED | OUTPATIENT
Start: 2025-03-05 | End: 2025-03-06 | Stop reason: HOSPADM

## 2025-03-05 RX ADMIN — LOSARTAN POTASSIUM: 50 TABLET, FILM COATED ORAL at 10:37

## 2025-03-05 RX ADMIN — ACETAMINOPHEN 325 MG: 325 TABLET ORAL at 10:36

## 2025-03-05 RX ADMIN — HYDROCODONE BITARTRATE AND ACETAMINOPHEN 1 TABLET: 5; 325 TABLET ORAL at 06:16

## 2025-03-05 RX ADMIN — HYDROCODONE BITARTRATE AND ACETAMINOPHEN 1 TABLET: 5; 325 TABLET ORAL at 01:01

## 2025-03-05 RX ADMIN — KETOROLAC TROMETHAMINE 15 MG: 15 INJECTION, SOLUTION INTRAMUSCULAR; INTRAVENOUS at 04:24

## 2025-03-05 RX ADMIN — ASPIRIN 81 MG: 81 TABLET, COATED ORAL at 20:11

## 2025-03-05 RX ADMIN — ASPIRIN 81 MG: 81 TABLET, COATED ORAL at 10:38

## 2025-03-05 RX ADMIN — FERROUS SULFATE TAB 325 MG (65 MG ELEMENTAL FE) 325 MG: 325 (65 FE) TAB at 10:37

## 2025-03-05 RX ADMIN — MELOXICAM 7.5 MG: 7.5 TABLET ORAL at 12:39

## 2025-03-05 RX ADMIN — SCOPOLAMINE 1 PATCH: 1.5 PATCH, EXTENDED RELEASE TRANSDERMAL at 10:47

## 2025-03-05 RX ADMIN — ACETAMINOPHEN 325 MG: 325 TABLET ORAL at 20:11

## 2025-03-05 RX ADMIN — CITALOPRAM HYDROBROMIDE 10 MG: 20 TABLET ORAL at 10:37

## 2025-03-05 RX ADMIN — LEVOTHYROXINE SODIUM 100 MCG: 0.1 TABLET ORAL at 06:16

## 2025-03-05 RX ADMIN — CEFAZOLIN SODIUM 2 G: 2 INJECTION, SOLUTION INTRAVENOUS at 04:24

## 2025-03-05 ASSESSMENT — COGNITIVE AND FUNCTIONAL STATUS - GENERAL
MOVING FROM LYING ON BACK TO SITTING ON SIDE OF FLAT BED WITH BEDRAILS: A LITTLE
WALKING IN HOSPITAL ROOM: A LITTLE
MOBILITY SCORE: 18
DRESSING REGULAR UPPER BODY CLOTHING: A LITTLE
DAILY ACTIVITIY SCORE: 19
DRESSING REGULAR LOWER BODY CLOTHING: A LITTLE
MOBILITY SCORE: 20
MOVING TO AND FROM BED TO CHAIR: A LITTLE
TOILETING: A LITTLE
MOVING FROM LYING ON BACK TO SITTING ON SIDE OF FLAT BED WITH BEDRAILS: A LITTLE
MOBILITY SCORE: 18
TURNING FROM BACK TO SIDE WHILE IN FLAT BAD: A LITTLE
MOVING TO AND FROM BED TO CHAIR: A LITTLE
HELP NEEDED FOR BATHING: A LITTLE
MOVING TO AND FROM BED TO CHAIR: A LITTLE
WALKING IN HOSPITAL ROOM: A LITTLE
TURNING FROM BACK TO SIDE WHILE IN FLAT BAD: A LITTLE
STANDING UP FROM CHAIR USING ARMS: A LITTLE
DAILY ACTIVITIY SCORE: 19
WALKING IN HOSPITAL ROOM: A LITTLE
STANDING UP FROM CHAIR USING ARMS: A LITTLE
CLIMB 3 TO 5 STEPS WITH RAILING: A LITTLE
DRESSING REGULAR LOWER BODY CLOTHING: A LITTLE
PERSONAL GROOMING: A LITTLE
CLIMB 3 TO 5 STEPS WITH RAILING: A LITTLE
HELP NEEDED FOR BATHING: A LITTLE
STANDING UP FROM CHAIR USING ARMS: A LITTLE
PERSONAL GROOMING: A LITTLE
CLIMB 3 TO 5 STEPS WITH RAILING: A LITTLE
TOILETING: A LITTLE
DRESSING REGULAR UPPER BODY CLOTHING: A LITTLE

## 2025-03-05 ASSESSMENT — ACTIVITIES OF DAILY LIVING (ADL)
HOME_MANAGEMENT_TIME_ENTRY: 9
ADL_ASSISTANCE: INDEPENDENT

## 2025-03-05 ASSESSMENT — PAIN SCALES - GENERAL
PAINLEVEL_OUTOF10: 0 - NO PAIN
PAINLEVEL_OUTOF10: 6
PAINLEVEL_OUTOF10: 5 - MODERATE PAIN
PAINLEVEL_OUTOF10: 10 - WORST POSSIBLE PAIN
PAINLEVEL_OUTOF10: 3
PAINLEVEL_OUTOF10: 4
PAINLEVEL_OUTOF10: 6
PAINLEVEL_OUTOF10: 5 - MODERATE PAIN

## 2025-03-05 ASSESSMENT — PAIN - FUNCTIONAL ASSESSMENT
PAIN_FUNCTIONAL_ASSESSMENT: 0-10

## 2025-03-05 ASSESSMENT — PAIN DESCRIPTION - DESCRIPTORS: DESCRIPTORS: TINGLING;BURNING

## 2025-03-05 ASSESSMENT — PAIN SCALES - PAIN ASSESSMENT IN ADVANCED DEMENTIA (PAINAD): TOTALSCORE: MEDICATION (SEE MAR);REPOSITIONED

## 2025-03-05 NOTE — NURSING NOTE
pt received oxy 10mg at 2105 for pain of 7 pt just reported seeing men and a cat in her room, when ask if she had problem with oxycontin, she stated no only issue with pain meds was percocet, . can we switch her to something else? please  this message was sent to Jojo Shin CNP

## 2025-03-05 NOTE — PROGRESS NOTES
"Lila Gay is a 79 y.o. female  presenting with S/P total right hip arthroplasty.    Subjective   Ms. Gay reportedly had hallucinations overnight while taking oxycodone, which was discontinued. Now has norco on order for pain. She struggled in therapy with lightheadedness and nausea during therapy today, is open to trying scopolamine patch.        Objective     Physical Exam  Vitals reviewed.   HENT:      Head: Normocephalic.      Mouth/Throat:      Mouth: Mucous membranes are moist.      Pharynx: Oropharynx is clear.   Eyes:      Extraocular Movements: Extraocular movements intact.   Cardiovascular:      Rate and Rhythm: Normal rate.   Pulmonary:      Effort: Pulmonary effort is normal.   Abdominal:      Palpations: Abdomen is soft.   Musculoskeletal:      Comments: Right anterior hip dressing is dry and intact.  light touch sensation is intact, ankle dorsiflexion/plantar flexion is intact. DP 2+/2 palpable     Skin:     General: Skin is warm and dry.      Capillary Refill: Capillary refill takes less than 2 seconds.   Neurological:      General: No focal deficit present.      Mental Status: She is alert. Mental status is at baseline.   Psychiatric:         Mood and Affect: Mood normal.         Last Recorded Vitals  Blood pressure 110/69, pulse 70, temperature 36.9 °C (98.4 °F), temperature source Temporal, resp. rate 18, height 1.626 m (5' 4\"), weight 99.8 kg (220 lb), SpO2 97%.  Intake/Output last 3 Shifts:  I/O last 3 completed shifts:  In: 2238.3 (22.4 mL/kg) [P.O.:180; I.V.:658.3 (6.6 mL/kg); IV Piggyback:1400]  Out: 850 (8.5 mL/kg) [Urine:700 (0.2 mL/kg/hr); Blood:150]  Weight: 99.8 kg     Relevant Results      Scheduled medications  acetaminophen, 325 mg, oral, q8h  aspirin, 81 mg, oral, BID  atorvastatin, 10 mg, oral, Daily  citalopram, 10 mg, oral, Daily  ferrous sulfate, 65 mg of iron, oral, Every other day  levothyroxine, 100 mcg, oral, Daily  losartan 50 mg, hydroCHLOROthiazide 12.5 mg for Hyzaar 50 " mg-12.5 mg, , oral, Daily  meloxicam, 7.5 mg, oral, Daily  polyethylene glycol, 17 g, oral, Daily  scopolamine, 1 patch, transdermal, q72h      Continuous medications  lactated Ringer's, 50 mL/hr, Last Rate: 50 mL/hr (03/04/25 1949)  oxygen, 2 L/min      PRN medications  PRN medications: bisacodyl, bisacodyl, cyclobenzaprine, HYDROcodone-acetaminophen, HYDROcodone-acetaminophen, HYDROmorphone, naloxone, ondansetron ODT **OR** ondansetron  Results for orders placed or performed during the hospital encounter of 03/04/25 (from the past 24 hours)   CBC   Result Value Ref Range    WBC 10.4 4.4 - 11.3 x10*3/uL    nRBC 0.0 0.0 - 0.0 /100 WBCs    RBC 3.24 (L) 4.00 - 5.20 x10*6/uL    Hemoglobin 10.2 (L) 12.0 - 16.0 g/dL    Hematocrit 32.0 (L) 36.0 - 46.0 %    MCV 99 80 - 100 fL    MCH 31.5 26.0 - 34.0 pg    MCHC 31.9 (L) 32.0 - 36.0 g/dL    RDW 12.6 11.5 - 14.5 %    Platelets 188 150 - 450 x10*3/uL   Basic metabolic panel   Result Value Ref Range    Glucose 124 (H) 74 - 99 mg/dL    Sodium 134 (L) 136 - 145 mmol/L    Potassium 4.9 3.5 - 5.3 mmol/L    Chloride 104 98 - 107 mmol/L    Bicarbonate 24 21 - 32 mmol/L    Anion Gap 11 10 - 20 mmol/L    Urea Nitrogen 29 (H) 6 - 23 mg/dL    Creatinine 1.13 (H) 0.50 - 1.05 mg/dL    eGFR 50 (L) >60 mL/min/1.73m*2    Calcium 8.7 8.6 - 10.3 mg/dL               XR pelvis 1-2 views    Result Date: 3/4/2025  Interpreted By:  Sujata Finnegan, STUDY: XR PELVIS 1-2 VIEWS;  3/4/2025 2:08 pm   INDICATION: Signs/Symptoms:Post op hip.   COMPARISON: None.   ACCESSION NUMBER(S): [id number]   ORDERING CLINICIAN: JAZMÍN MICHELLE   FINDINGS: AP pelvis:   Status post right total hip arthroplasty. There is good position of the femoral head prosthesis in the acetabular prosthesis. There central position of the femoral prosthesis centrally in the medullary cavity of the proximal right femur.       Status post right total hip arthroplasty.   MACRO: None   Signed by: Sujata Finnegan 3/4/2025 2:33 PM Dictation  workstation:   [id number]    FL fluoro images no charge    Result Date: 3/4/2025  These images are not reportable by radiology and will not be interpreted by  Radiologists.    ECG 12 Lead    Result Date: 3/2/2025  Normal sinus rhythm Normal ECG Confirmed by Sergei Davis ([id number]) on 3/2/2025 4:55:40 PM                Assessment/Plan   Assessment & Plan  S/P total right hip arthroplasty    POD #1 s/p anterior right GEORGE  Continue PT/OT, WBAT right   LE  Using incentive spirometer   Reviewed am labs  GI regimen, will add scopolamine patch and reassess  Multimodal pain regimen  Surgical hip dressing to be removed on post op day #7  VTE prophylaxis: aspirin 81mg BID  When appropriate, will discharge home with [facility]  Follow up with Dr. Song in 2 weeks         I spent 25 minutes in the professional and overall care of this patient.      Paula Alvarez PA-C

## 2025-03-05 NOTE — PROGRESS NOTES
Occupational Therapy    Occupational Therapy    Evaluation    Patient Name: Lila Gay  MRN: [id number]  Today's Date: 3/5/2025  Time Calculation  Start Time: 0931  Stop Time: 0955  Time Calculation (min): 24 min  4119/4119-A    Assessment  IP OT Assessment  OT Assessment:  (Pt. presents with decreased balance and endurance impacting pt. ability to complete ADLs and mobility independently)  Prognosis: Good  Barriers to Discharge Home: Caregiver assistance, Physical needs  Caregiver Assistance: Patient lives alone and/or does not have reliable caregiver assistance  Physical Needs: Intermittent mobility assistance needed, Intermittent ADL assistance needed  Evaluation/Treatment Tolerance: Patient limited by pain  End of Session Communication: Bedside nurse  End of Session Patient Position: Up in chair, Alarm on    Plan:  Treatment Interventions: ADL retraining, Functional transfer training, Endurance training  OT Frequency: Daily  OT Discharge Recommendations: Low intensity level of continued care  Equipment Recommended upon Discharge: Wheeled walker  OT Recommended Transfer Status:  (CGA)  OT - OK to Discharge: Yes (Next level of care when cleared by medical team)    Subjective     Per EMR:    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 Song  Past Medical History Relevant to Rehab: Anxiety  Family/Caregiver Present: No  Prior to Session Communication: Bedside nurse  Patient Position Received: Up in chair, Alarm on    Precautions:  LE Weight Bearing Status: Weight Bearing as Tolerated  Medical Precautions: Fall precautions        Pain:  Pain Assessment  Pain Assessment: 0-10  0-10 (Numeric) Pain Score: 5 - Moderate pain (5 at rest, 10 with any movement)  Pain Type:  Surgical pain  Pain Location: Hip  Pain Orientation: Right    Objective     Cognition:  Overall Cognitive Status: Within Functional Limits  Orientation Level: Oriented X4  Insight: Within function limits             Home Living:  Home Living Comments:  (Pt. lives alone in apt with elevator access with tub, grab bars and seat. PLOF MOD I with cane. Owns ww and rollator. States friend will be staying with her to assist)     Prior Function:  Level of [place]: Independent with ADLs and functional transfers  ADL Assistance: Independent  Homemaking Assistance: Independent  Ambulatory Assistance: Independent      ADL:  Grooming Assistance: Stand by  LE Dressing Assistance: Stand by  LE Dressing Deficit: Don/doff R sock, Don/doff L sock, Thread RLE into pants, Thread LLE into pants, Use of adaptive equipment (Educated on use of AE for dressing. completes with cues)    Activity Tolerance:  Endurance: Tolerates 10 - 20 min exercise with multiple rests    Bed Mobility/Transfers:      Transfers  Transfer:  (sit<>stand CGA with cues for safe hand placement)        Sitting Balance:  Static Sitting Balance  Static Sitting-Balance Support: No upper extremity supported  Static Sitting-Level of Assistance: Independent    Standing Balance:  Static Standing Balance  Static Standing-Balance Support: Bilateral upper extremity supported  Static Standing-Level of Assistance: Close supervision      Sensation:  Sensation Comment: Denies numbness        Hand Function:  Hand Function  Gross Grasp: Functional  Coordination: Functional    Extremities: RUE   RUE : Within Functional Limits and LUE   LUE: Within Functional Limits    Outcome Measures: [facility] Daily Activity  Putting on and taking off regular lower body clothing: A little  Bathing (including washing, rinsing, drying): A little  Putting on and taking off regular upper body clothing: A little  Toileting, which includes using toilet, bedpan or urinal: A little  Taking care of  personal grooming such as brushing teeth: A little  Eating Meals: None  Daily Activity - Total Score: 19                       EDUCATION:  Education  Individual(s) Educated: Patient  Education Provided: Fall precautons, Risk and benefits of OT discussed with patient or other, POC discussed and agreed upon  Patient Response to Education: Patient/Caregiver Verbalized Understanding of Information    Pt. Educated on safe car transfers, removal of throw rugs in home and ideas for completing cat care at home. Recommend completing litter box activities seated and purchasing long handle . Pt. Receptive of education.         Goals:   Encounter Problems       Encounter Problems (Active)       Dressings Lower Extremities       STG - Patient to complete lower body dressing MOD I       Start:  03/05/25    Expected End:  03/19/25               Functional Balance       LTG - Patient will maintain standing and sitting balance to allow for completion of daily activities       Start:  03/05/25    Expected End:  03/19/25               Grooming       STG - Patient completes grooming MOD I       Start:  03/05/25    Expected End:  03/19/25               OT Transfers       STG - Patient will perform toilet transfer MOD I       Start:  03/05/25    Expected End:  03/19/25            STG - Patient will follow hip precautions during transfers       Start:  03/05/25    Expected End:  03/19/25

## 2025-03-05 NOTE — CARE PLAN
The patient's goals for the shift include      The clinical goals for the shift include pt will demonstrate comfort aeb sleeping in intervals of 3 hours or greater by end of this shift      Problem: Pain  Goal: Takes deep breaths with improved pain control throughout the shift  Outcome: Progressing

## 2025-03-05 NOTE — PROGRESS NOTES
Physical Therapy    Physical Therapy    Physical Therapy Treatment    Patient Name: Lila Gay  MRN: [id number]  Today's Date: 3/5/2025  Time Calculation  Start Time: 1050  Stop Time: 1121  Time Calculation (min): 31 min     4119/4119-A       03/05/25 1050   PT  Visit   PT Received On 03/05/25   Response to Previous Treatment Patient with no complaints from previous session.   General   Reason for Referral impaired mobility   Referred By Dr. Song   Past Medical History Relevant to Rehab Anxiety, CKD, HLD, HTN, macular degeneration   Prior to Session Communication Bedside nurse   Patient Position Received Alarm on;Up in chair   Preferred Learning Style kinesthetic;verbal   General Comment Pt agreeable to therapy ad cleared for participation   Precautions   LE Weight Bearing Status Weight Bearing as Tolerated  (RLE)   Medical Precautions Fall precautions   Pain Assessment   Pain Assessment 0-10   0-10 (Numeric) Pain Score   (Pt states no pain at rest. However, states 10/10 with random burning pain when up.)   Pain Interventions Repositioned   Cognition   Overall Cognitive Status WFL   Orientation Level Oriented X4   Therapeutic Exercise   Therapeutic Exercise Performed Yes   Therapeutic Exercise Activity 1 AP, LAQ, ball squeeze, abd, GS x20   Ambulation/Gait Training   Ambulation/Gait Training Performed Yes   Ambulation/Gait Training 1   Surface 1 Level tile   Device 1 Rolling walker   Gait Support Devices Gait belt   Assistance 1 Contact guard;Close supervision;Moderate verbal cues   Quality of Gait 1 Diminished heel strike;Antalgic;Decreased step length   Comments/Distance (ft) 1 15', 20' very slow franklin with step to gait pattern, emphasis on heel strike/toe off pattern. Mod cues for sequencing throughout. Pt is very anxious d/t previous episode of lightheadedness. Max reassurance given, chair follow for increased safety. Seated rest breaks between bouts.   Transfers   Transfer Yes   Transfer 1   Transfer From 1  Chair with arms to   Transfer to 1 Sit;Stand   Technique 1 Sit to stand;Stand to sit   Transfer Device 1 Walker;Gait belt   Transfer Level of Assistance 1 Contact guard;Close supervision;Moderate verbal cues   Trials/Comments 1 x5 with cues for technique and safety, very slow to transition   Activity Tolerance   Endurance Tolerates 10 - 20 min exercise with multiple rests   PT Assessment   PT Assessment Results Decreased strength;Decreased endurance;Impaired balance;Decreased mobility;Decreased safety awareness   Rehab Prognosis Good   End of Session Communication Bedside nurse   Assessment Comment Pt put forth good effort. Pt  is very slow to transition requiring increased time and effort for all activities with seated rest breaks. Max encouragement given.   End of Session Patient Position Up in chair;Alarm on     Outcome Measures:  [facility] Basic Mobility  Turning from your back to your side while in a flat bed without using bedrails: A little  Moving from lying on your back to sitting on the side of a flat bed without using bedrails: A little  Moving to and from bed to chair (including a wheelchair): A little  Standing up from a chair using your arms (e.g. wheelchair or bedside chair): A little  To walk in hospital room: A little  Climbing 3-5 steps with railing: A little  Basic Mobility - Total Score: 18                             EDUCATION:     Education Documentation  Precautions, taught by Adrienne Marcano PTA at 3/5/2025 12:31 PM.  Learner: Patient  Readiness: Acceptance  Method: Explanation, Demonstration  Response: Verbalizes Understanding, Demonstrated Understanding, Needs Reinforcement    Body Mechanics, taught by Adrienne Marcano PTA at 3/5/2025 12:31 PM.  Learner: Patient  Readiness: Acceptance  Method: Explanation, Demonstration  Response: Verbalizes Understanding, Demonstrated Understanding, Needs Reinforcement    Mobility Training, taught by Adrienne Marcano PTA at 3/5/2025 12:31 PM.  Learner:  Patient  Readiness: Acceptance  Method: Explanation, Demonstration  Response: Verbalizes Understanding, Demonstrated Understanding, Needs Reinforcement    Precautions, taught by Adrienne Marcano PTA at 3/5/2025  9:13 AM.  Learner: Patient  Readiness: Acceptance  Method: Explanation, Demonstration  Response: Verbalizes Understanding, Demonstrated Understanding, Needs Reinforcement    Body Mechanics, taught by Adrienne Marcano PTA at 3/5/2025  9:13 AM.  Learner: Patient  Readiness: Acceptance  Method: Explanation, Demonstration  Response: Verbalizes Understanding, Demonstrated Understanding, Needs Reinforcement    Mobility Training, taught by Adrienne Marcano PTA at 3/5/2025  9:13 AM.  Learner: Patient  Readiness: Acceptance  Method: Explanation, Demonstration  Response: Verbalizes Understanding, Demonstrated Understanding, Needs Reinforcement    Education Comments  No comments found.        GOALS:  Encounter Problems       Encounter Problems (Active)       PT Problem       Pt will demonstrate mod I for all bed mobility         Start:  03/04/25    Expected End:  03/11/25            Pt will demonstrate mod I for all transfers with WW        Start:  03/04/25    Expected End:  03/11/25            Pt will ambulate 250 ft with WW and mod I.        Start:  03/04/25    Expected End:  03/11/25            Pt will demonstrate good dynamic standing balance while performing a functional task.         Start:  03/04/25    Expected End:  03/11/25               Pain - Adult

## 2025-03-05 NOTE — PROGRESS NOTES
Physical Therapy    Physical Therapy    Physical Therapy Treatment    Patient Name: Lila Gay  MRN: [id number]  Today's Date: 3/5/2025  Time Calculation  Start Time: 0750  Stop Time: 0815  Time Calculation (min): 25 min     4119/4119-A       03/05/25 0750   PT  Visit   PT Received On 03/05/25   Response to Previous Treatment Patient with no complaints from previous session.   General   Reason for Referral impaired mobility   Referred By Dr. Song   Past Medical History Relevant to Rehab Anxiety, CKD, HLD, HTN, macular degeneration   Prior to Session Communication Bedside nurse   Patient Position Received Bed, 3 rail up;Alarm on   Preferred Learning Style kinesthetic;verbal   General Comment Pt agreeable to therapy ad cleared for participation   Precautions   LE Weight Bearing Status Weight Bearing as Tolerated  (RLE)   Medical Precautions Fall precautions   Vital Signs   Heart Rate 64   Heart Rate Source Monitor   SpO2   (90-94% on RA)   BP   (102/69 after activity and c/o lightheadedness and nausea)   BP Location Right arm   BP Method Automatic   Pain Assessment   Pain Assessment 0-10   0-10 (Numeric) Pain Score 6   Pain Type Surgical pain   Pain Location Hip   Pain Orientation Right;Anterior   Pain Interventions Repositioned   Cognition   Overall Cognitive Status WFL   Orientation Level Oriented X4   Bed Mobility   Bed Mobility Yes   Bed Mobility 1   Bed Mobility 1 Supine to sitting   Level of Assistance 1 Minimum assistance;Minimal verbal cues   Bed Mobility Comments 1 Pt educated and demo given on use of gait belt as leg  to bring RLE off EOB. Good follow through   Ambulation/Gait Training   Ambulation/Gait Training Performed Yes   Ambulation/Gait Training 1   Surface 1 Level tile   Device 1 Rolling walker   Gait Support Devices Gait belt   Assistance 1 Contact guard;Close supervision;Moderate verbal cues   Quality of Gait 1 Diminished heel strike;Antalgic;Decreased step length   Comments/Distance (ft) 1  15' step to gait with cues for sequencing, good follow through   Transfers   Transfer Yes   Transfer 1   Transfer From 1 Bed to   Technique 1 Sit to stand   Transfer Device 1 Walker;Gait belt   Transfer Level of Assistance 1 Contact guard;Moderate verbal cues   Trials/Comments 1 cues for technique and safety   Transfers 2   Transfer From 2 Stand to;Sit to   Transfer to 2 Commode-drop arm   Technique 2 Sit to stand;Stand to sit   Transfer Device 2 Walker;Gait belt   Transfer Level of Assistance 2 Minimum assistance;Moderate verbal cues;+2   Trials/Comments 2 Pt became lightheaded, nauseous and pale while sitting on commode. MinAx2 for safety to stand and transfer to chair with B LE's elevated, vitals taken with nurse in room.   Activity Tolerance   Endurance Tolerates 10 - 20 min exercise with multiple rests   PT Assessment   PT Assessment Results Decreased strength;Decreased endurance;Impaired balance;Decreased mobility;Decreased safety awareness   Rehab Prognosis Good   End of Session Communication Bedside nurse   Assessment Comment Pt put forth good effort. Limited by lightheadedness.   End of Session Patient Position Up in chair;Alarm on     Outcome Measures:  [facility] Basic Mobility  Turning from your back to your side while in a flat bed without using bedrails: A little  Moving from lying on your back to sitting on the side of a flat bed without using bedrails: A little  Moving to and from bed to chair (including a wheelchair): A little  Standing up from a chair using your arms (e.g. wheelchair or bedside chair): A little  To walk in hospital room: A little  Climbing 3-5 steps with railing: A little  Basic Mobility - Total Score: 18                             EDUCATION:     Education Documentation  Precautions, taught by Adrienne Marcano PTA at 3/5/2025  9:13 AM.  Learner: Patient  Readiness: Acceptance  Method: Explanation, Demonstration  Response: Verbalizes Understanding, Demonstrated Understanding, Needs  Reinforcement    Body Mechanics, taught by Adrienne Marcano PTA at 3/5/2025  9:13 AM.  Learner: Patient  Readiness: Acceptance  Method: Explanation, Demonstration  Response: Verbalizes Understanding, Demonstrated Understanding, Needs Reinforcement    Mobility Training, taught by Adrienne Marcano PTA at 3/5/2025  9:13 AM.  Learner: Patient  Readiness: Acceptance  Method: Explanation, Demonstration  Response: Verbalizes Understanding, Demonstrated Understanding, Needs Reinforcement    Education Comments  No comments found.        GOALS:  Encounter Problems       Encounter Problems (Active)       PT Problem       Pt will demonstrate mod I for all bed mobility         Start:  03/04/25    Expected End:  03/11/25            Pt will demonstrate mod I for all transfers with WW        Start:  03/04/25    Expected End:  03/11/25            Pt will ambulate 250 ft with WW and mod I.        Start:  03/04/25    Expected End:  03/11/25            Pt will demonstrate good dynamic standing balance while performing a functional task.         Start:  03/04/25    Expected End:  03/11/25               Pain - Adult

## 2025-03-05 NOTE — CARE PLAN
The patient's goals for the shift include      The clinical goals for the shift include pt will demonstrate comfort aeb sleeping in intervals of 3 hours or greater by end of this shift    Over the shift, the patient did make progress toward the following goals. Pt is alert and oriented x 3 rating pain when ambulating as high as 7 in which she received ordered oxy 10mg when reassessed  1 hour afterword, pt rated pain as improving . However when checking on noted pt to be talking in her sleep when I spoke with her she stated she was seeing man and cats In her room, when asked if she every had issues with meds she stated percocet gave her issues before obtained order to switch pain meds to norco , pt received norco with improved results, pt has been up to bedside commode with assist of 1 person assist gait belt and walker , gait is steady and pt is following instructions well, pt been able to practice exercises , and I. S and instructed, pt is able to reposition self In bed, pt has been able to make needs known , use ncl and has bed alarm activated,

## 2025-03-05 NOTE — PROGRESS NOTES
03/05/25 1027   Discharge Planning   Living Arrangements Alone   Support Systems Children   Type of Residence Private residence   Home or Post Acute Services In home services   Type of Home Care Services Home PT   Expected Discharge Disposition Home   Does the patient need discharge transport arranged? Yes     Met with patient at bedside. Admitted for R [facility]. Pt lives alone and was independent PTA with no HHC. Pt has a walker. PCP is Ijeoma Biswas. Pt feels she is able to manage her health and understands her medications. Was able to drive and obtain meds. Pt plans to return home with [facility]. Pt has number to call [facility] when she arrives home. Family member will be staying with patient during recovery and will provide transport home.

## 2025-03-05 NOTE — PROGRESS NOTES
Physical Therapy    Physical Therapy Evaluation    Patient Name: Lila Gay  MRN: [id number]  Today's Date: 3/4/2025   Time Calculation  Start Time: 1716  Stop Time: 1747  Time Calculation (min): 31 min  4119/4119-A    Assessment/Plan   PT Assessment  PT Assessment Results: Decreased strength, Decreased endurance, Impaired balance, Decreased mobility, Decreased safety awareness  Rehab Prognosis: Good  Evaluation/Treatment Tolerance:  (Patient limited by PONV)  Medical Staff Made Aware: Yes  End of Session Communication: Bedside nurse  Assessment Comment: Pt is a 79 y.o. female admitted for Unilateral primary osteoarthritis, right hip [M16.11]  S/P total right hip arthroplasty [Z96.641] on 3/4/2025. Pt below functional level and will benefit from skilled therapy during stay to improve overall functional mobility, strength, ROM, endurance and safety awareness. Upon discharge pt will benefit from low intensity therapy for continued improvement in functional mobility. Therapy will continue to follow and reassess each session.      End of Session Patient Position: Up in chair, Alarm on  IP OR SWING BED PT PLAN  Inpatient or Swing Bed: Inpatient  PT Plan  Treatment/Interventions: Bed mobility, Transfer training, Gait training, Balance training, Strengthening, Therapeutic exercise, Therapeutic activity, Endurance training  PT Plan: Ongoing PT  PT Frequency: BID  PT Discharge Recommendations: Low intensity level of continued care (pending PT progress)  Equipment Recommended upon Discharge: Wheeled walker  PT Recommended Transfer Status: Assist x1  PT - OK to Discharge: Yes    Subjective     Current Problem:  No diagnosis found.  Patient Active Problem List   Diagnosis    S/P total right hip arthroplasty       General Visit Information:  General  Reason for Referral: impaired mobility  Referred By: Dr. Song  Past Medical History Relevant to Rehab: Anxiety, CKD, HLD, HTN, macular degeneration  Family/Caregiver Present:  Yes  Caregiver Feedback: son at bedside  Prior to Session Communication: Bedside nurse  Patient Position Received: Bed, 3 rail up  Preferred Learning Style: kinesthetic, verbal  General Comment: Pt agreeable to therapy    Home Living:  Home Living  Type of Home: Apartment  Lives With: Alone  Home Adaptive Equipment: Walker rolling or standard, Cane  Home Layout:  (lives on 15th floor, elevator access)  Home Access: Stairs to enter with rails  Entrance Stairs-Rails: Both  Entrance Stairs-Number of Steps: 3  Bathroom Shower/Tub: Tub/shower unit  Bathroom Equipment: Grab bars in shower, Shower chair with back    Prior Level of Function:  Prior Function Per Pt/Caregiver Report  Level of [place]: Independent with ADLs and functional transfers, Independent with homemaking with ambulation    Precautions:  Precautions  LE Weight Bearing Status: Weight Bearing as Tolerated  Medical Precautions: Fall precautions  Precautions Comment: anterior hip precautions    Vital Signs:     Objective     Pain:  Pain Assessment  Pain Assessment: 0-10  0-10 (Numeric) Pain Score: 5 - Moderate pain (increased pain to 8/10 with weight bearing)  Pain Orientation: Right  Pain Descriptors: Burning  Pain Interventions: Cold applied, Repositioned    Cognition:  Cognition  Overall Cognitive Status: Within Functional Limits  Orientation Level: Oriented X4    General Assessments:      Activity Tolerance  Endurance: Decreased tolerance for upright activites (d/t n/v)     Static Sitting Balance  Static Sitting-Comment/Number of Minutes: good  Dynamic Sitting Balance  Dynamic Sitting-Comments: fair  Static Standing Balance  Static Standing-Comment/Number of Minutes: fair  Dynamic Standing Balance  Dynamic Standing-Comments: fair    Functional Assessments:     Bed Mobility  Bed Mobility: Yes  Bed Mobility 1  Bed Mobility 1: Supine to sitting  Level of Assistance 1: Minimum assistance  Transfers  Transfer: Yes  Transfer 1  Transfer From 1: Bed  to  Transfer to 1: Commode-standard  Technique 1: Sit to stand, Stand to sit, Stand pivot  Transfer Device 1: Walker, Gait belt  Transfer Level of Assistance 1: Minimum assistance  Transfers 2  Transfer From 2: Commode-standard to  Transfer to 2: Chair with arms  Technique 2: Stand pivot  Transfer Device 2: Walker, Gait belt  Transfer Level of Assistance 2: Minimum assistance             Extremity/Trunk Assessments:        RLE   RLE : Within Functional Limits (except hip flexion)  LLE   LLE : Within Functional Limits    Outcome Measures:     [facility] Basic Mobility  Turning from your back to your side while in a flat bed without using bedrails: A little  Moving from lying on your back to sitting on the side of a flat bed without using bedrails: A little  Moving to and from bed to chair (including a wheelchair): A little  Standing up from a chair using your arms (e.g. wheelchair or bedside chair): A little  To walk in hospital room: A lot  Climbing 3-5 steps with railing: A lot  Basic Mobility - Total Score: 16          Goals:  Encounter Problems       Encounter Problems (Active)       PT Problem       Pt will demonstrate mod I for all bed mobility         Start:  03/04/25    Expected End:  03/11/25            Pt will demonstrate mod I for all transfers with WW        Start:  03/04/25    Expected End:  03/11/25            Pt will ambulate 250 ft with WW and mod I.        Start:  03/04/25    Expected End:  03/11/25            Pt will demonstrate good dynamic standing balance while performing a functional task.         Start:  03/04/25    Expected End:  03/11/25               Pain - Adult            Education Documentation  Precautions, taught by Clementina Neves PT at 3/4/2025  8:12 PM.  Learner: Patient  Readiness: Acceptance  Method: Explanation  Response: Verbalizes Understanding, Needs Reinforcement    Body Mechanics, taught by Clementina Neves, PT at 3/4/2025  8:12 PM.  Learner: Patient  Readiness:  Acceptance  Method: Explanation  Response: Verbalizes Understanding, Needs Reinforcement    Mobility Training, taught by Clementina Neves PT at 3/4/2025  8:12 PM.  Learner: Patient  Readiness: Acceptance  Method: Explanation  Response: Verbalizes Understanding, Needs Reinforcement    Education Comments  No comments found.

## 2025-03-06 ENCOUNTER — PHARMACY VISIT (OUTPATIENT)
Dept: PHARMACY | Facility: CLINIC | Age: 80
End: 2025-03-06
Payer: COMMERCIAL

## 2025-03-06 VITALS
BODY MASS INDEX: 37.56 KG/M2 | DIASTOLIC BLOOD PRESSURE: 88 MMHG | RESPIRATION RATE: 18 BRPM | TEMPERATURE: 98.6 F | OXYGEN SATURATION: 96 % | SYSTOLIC BLOOD PRESSURE: 160 MMHG | HEIGHT: 64 IN | WEIGHT: 220 LBS | HEART RATE: 77 BPM

## 2025-03-06 LAB
ANION GAP SERPL CALC-SCNC: 12 MMOL/L (ref 10–20)
BUN SERPL-MCNC: 26 MG/DL (ref 6–23)
CALCIUM SERPL-MCNC: 8.8 MG/DL (ref 8.6–10.3)
CHLORIDE SERPL-SCNC: 105 MMOL/L (ref 98–107)
CO2 SERPL-SCNC: 25 MMOL/L (ref 21–32)
CREAT SERPL-MCNC: 0.87 MG/DL (ref 0.5–1.05)
EGFRCR SERPLBLD CKD-EPI 2021: 69 ML/MIN/1.73M*2
ERYTHROCYTE [DISTWIDTH] IN BLOOD BY AUTOMATED COUNT: 12.8 % (ref 11.5–14.5)
GLUCOSE SERPL-MCNC: 111 MG/DL (ref 74–99)
HCT VFR BLD AUTO: 29.2 % (ref 36–46)
HGB BLD-MCNC: 9.5 G/DL (ref 12–16)
MCH RBC QN AUTO: 31.7 PG (ref 26–34)
MCHC RBC AUTO-ENTMCNC: 32.5 G/DL (ref 32–36)
MCV RBC AUTO: 97 FL (ref 80–100)
NRBC BLD-RTO: 0 /100 WBCS (ref 0–0)
PLATELET # BLD AUTO: 146 X10*3/UL (ref 150–450)
POTASSIUM SERPL-SCNC: 4 MMOL/L (ref 3.5–5.3)
RBC # BLD AUTO: 3 X10*6/UL (ref 4–5.2)
SODIUM SERPL-SCNC: 138 MMOL/L (ref 136–145)
WBC # BLD AUTO: 7.2 X10*3/UL (ref 4.4–11.3)

## 2025-03-06 PROCEDURE — 36415 COLL VENOUS BLD VENIPUNCTURE: CPT | Performed by: PHYSICIAN ASSISTANT

## 2025-03-06 PROCEDURE — 2500000002 HC RX 250 W HCPCS SELF ADMINISTERED DRUGS (ALT 637 FOR MEDICARE OP, ALT 636 FOR OP/ED): Performed by: ORTHOPAEDIC SURGERY

## 2025-03-06 PROCEDURE — 80048 BASIC METABOLIC PNL TOTAL CA: CPT | Performed by: PHYSICIAN ASSISTANT

## 2025-03-06 PROCEDURE — 2500000001 HC RX 250 WO HCPCS SELF ADMINISTERED DRUGS (ALT 637 FOR MEDICARE OP): Performed by: PHYSICIAN ASSISTANT

## 2025-03-06 PROCEDURE — RXMED WILLOW AMBULATORY MEDICATION CHARGE

## 2025-03-06 PROCEDURE — 7100000011 HC EXTENDED STAY RECOVERY HOURLY - NURSING UNIT

## 2025-03-06 PROCEDURE — 2500000004 HC RX 250 GENERAL PHARMACY W/ HCPCS (ALT 636 FOR OP/ED): Performed by: ORTHOPAEDIC SURGERY

## 2025-03-06 PROCEDURE — 97116 GAIT TRAINING THERAPY: CPT | Mod: GP,CQ

## 2025-03-06 PROCEDURE — 97110 THERAPEUTIC EXERCISES: CPT | Mod: GP,CQ

## 2025-03-06 PROCEDURE — 85027 COMPLETE CBC AUTOMATED: CPT | Performed by: PHYSICIAN ASSISTANT

## 2025-03-06 PROCEDURE — 2500000001 HC RX 250 WO HCPCS SELF ADMINISTERED DRUGS (ALT 637 FOR MEDICARE OP): Performed by: ORTHOPAEDIC SURGERY

## 2025-03-06 RX ORDER — POLYETHYLENE GLYCOL 3350 17 G/17G
17 POWDER, FOR SOLUTION ORAL DAILY
Qty: 7 PACKET | Refills: 0 | Status: SHIPPED | OUTPATIENT
Start: 2025-03-07 | End: 2025-03-14

## 2025-03-06 RX ORDER — MELOXICAM 7.5 MG/1
7.5 TABLET ORAL DAILY
Qty: 7 TABLET | Refills: 0 | Status: SHIPPED | OUTPATIENT
Start: 2025-03-07 | End: 2025-03-14

## 2025-03-06 RX ORDER — ASPIRIN 81 MG/1
81 TABLET ORAL 2 TIMES DAILY
Qty: 60 TABLET | Refills: 0 | Status: SHIPPED | OUTPATIENT
Start: 2025-03-06 | End: 2025-04-05

## 2025-03-06 RX ORDER — HYDROCODONE BITARTRATE AND ACETAMINOPHEN 5; 325 MG/1; MG/1
1 TABLET ORAL EVERY 6 HOURS PRN
Qty: 28 TABLET | Refills: 0 | Status: SHIPPED | OUTPATIENT
Start: 2025-03-06 | End: 2025-03-13

## 2025-03-06 RX ORDER — ACETAMINOPHEN 325 MG/1
325 TABLET ORAL EVERY 6 HOURS PRN
Qty: 90 TABLET | Refills: 0 | Status: SHIPPED | OUTPATIENT
Start: 2025-03-06 | End: 2025-04-05

## 2025-03-06 RX ADMIN — CITALOPRAM HYDROBROMIDE 10 MG: 20 TABLET ORAL at 08:28

## 2025-03-06 RX ADMIN — ATORVASTATIN CALCIUM 10 MG: 10 TABLET, FILM COATED ORAL at 08:28

## 2025-03-06 RX ADMIN — LEVOTHYROXINE SODIUM 100 MCG: 0.1 TABLET ORAL at 05:05

## 2025-03-06 RX ADMIN — HYDROCODONE BITARTRATE AND ACETAMINOPHEN 0.5 TABLET: 5; 325 TABLET ORAL at 14:26

## 2025-03-06 RX ADMIN — ASPIRIN 81 MG: 81 TABLET, COATED ORAL at 08:28

## 2025-03-06 RX ADMIN — MELOXICAM 7.5 MG: 7.5 TABLET ORAL at 08:28

## 2025-03-06 RX ADMIN — LOSARTAN POTASSIUM: 50 TABLET, FILM COATED ORAL at 08:28

## 2025-03-06 RX ADMIN — POLYETHYLENE GLYCOL 3350 17 G: 17 POWDER, FOR SOLUTION ORAL at 08:27

## 2025-03-06 RX ADMIN — ACETAMINOPHEN 325 MG: 325 TABLET ORAL at 05:05

## 2025-03-06 ASSESSMENT — PAIN - FUNCTIONAL ASSESSMENT
PAIN_FUNCTIONAL_ASSESSMENT: 0-10

## 2025-03-06 ASSESSMENT — PAIN SCALES - GENERAL
PAINLEVEL_OUTOF10: 6
PAINLEVEL_OUTOF10: 4
PAINLEVEL_OUTOF10: 6
PAINLEVEL_OUTOF10: 5 - MODERATE PAIN

## 2025-03-06 ASSESSMENT — COGNITIVE AND FUNCTIONAL STATUS - GENERAL
MOBILITY SCORE: 20
MOVING FROM LYING ON BACK TO SITTING ON SIDE OF FLAT BED WITH BEDRAILS: A LITTLE
MOVING TO AND FROM BED TO CHAIR: A LITTLE
TURNING FROM BACK TO SIDE WHILE IN FLAT BAD: A LITTLE
CLIMB 3 TO 5 STEPS WITH RAILING: A LITTLE

## 2025-03-06 NOTE — PROGRESS NOTES
Occupational Therapy                 Therapy Communication Note    Patient Name: Lila Gay  MRN: [id number]  Department: [facility] 4 OBS  Room: [facility]9/4119-A  Today's Date: 3/6/2025     Discipline: Occupational Therapy          Missed Visit Reason:      Missed Time: Attempt    Comment:  Attempted to see patient this afternoon, however, patient was in bed lightly sleeping and easily arousable.  Despite encouragement, patient did not wish to get out of bed and work with O.T.   Patient too fatigued.

## 2025-03-06 NOTE — PROGRESS NOTES
Physical Therapy    Physical Therapy    Physical Therapy Treatment    Patient Name: Lila Gay  MRN: [id number]  Today's Date: 3/6/2025  Time Calculation  Start Time: 0914  Stop Time: 0959  Time Calculation (min): 45 min     4119/4119-A    Assessment/Plan   PT Assessment  PT Assessment Results: Decreased strength, Decreased endurance, Impaired balance, Decreased mobility, Decreased safety awareness  Rehab Prognosis: Good  End of Session Communication: Bedside nurse  End of Session Patient Position: Up in chair, Alarm on  PT Plan  Inpatient/Swing Bed or Outpatient: Inpatient  PT Plan  Treatment/Interventions: Bed mobility, Transfer training, Gait training, Balance training, Strengthening, Therapeutic exercise, Therapeutic activity, Endurance training  PT Plan: Ongoing PT  PT Frequency: BID  PT Discharge Recommendations: Low intensity level of continued care (pending PT progress)  Equipment Recommended upon Discharge: Wheeled walker  PT Recommended Transfer Status: Assist x1  PT - OK to Discharge: Yes     03/06/25 0914   PT  Visit   PT Received On 03/06/25   Response to Previous Treatment Patient with no complaints from previous session.   General   Reason for Referral impaired mobility   Referred By Dr. Song   Past Medical History Relevant to Rehab Anxiety, CKD, HLD, HTN, macular degeneration   Family/Caregiver Present No   Prior to Session Communication Bedside nurse   General Comment Pt agreeable to therapy ad cleared for participation   Precautions   LE Weight Bearing Status Weight Bearing as Tolerated   Medical Precautions Fall precautions   Precautions Comment anterior hip precautions   Pain Assessment   Pain Assessment 0-10   0-10 (Numeric) Pain Score 6   Pain Type Surgical pain   Pain Location Hip   Pain Orientation Right   Cognition   Orientation Level Oriented X4   Therapeutic Exercise   Therapeutic Exercise Performed Yes   Therapeutic Exercise Activity 1 AP,  QS,  GS,  Hip Abduction,  Marchin,  LAQ,  Ball  Squeezes  x 20 B with emphasis on R LE   Bed Mobility   Bed Mobility Yes   Ambulation/Gait Training   Ambulation/Gait Training Performed Yes   Ambulation/Gait Training 1   Surface 1 Level tile   Device 1 Rolling walker   Gait Support Devices Gait belt   Assistance 1 Close supervision   Comments/Distance (ft) 1 125, 150  (Patient demos slower reciprocating steps)   Transfers   Transfer Yes   Transfer 1   Technique 1 Sit to stand;Stand to sit   Transfer Device 1 Walker;Gait belt   Transfer Level of Assistance 1 Contact guard;Close supervision;Moderate verbal cues   Trials/Comments 1 x3  (Patient needing reminders to scoot forward in the chair prior to attempting to stand)   Stairs   Stairs Yes   Stairs   Rails 1 Right   Device 1 Single point cane   Support Devices 1 Gait belt   Assistance 1 Minimum assistance   Comment/Number of Steps 1 x3  (Patient dmeos good stability on stairs will be using a ramp and elevator to enter the apartment building)   Activity Tolerance   Endurance Tolerates 10 - 20 min exercise with multiple rests   PT Assessment   PT Assessment Results Decreased strength;Decreased endurance;Impaired balance;Decreased mobility;Decreased safety awareness   Rehab Prognosis Good   End of Session Communication Bedside nurse   End of Session Patient Position Up in chair;Alarm on   PT Plan   Inpatient/Swing Bed or Outpatient Inpatient     Outcome Measures:  [facility] Basic Mobility  Turning from your back to your side while in a flat bed without using bedrails: A little  Moving from lying on your back to sitting on the side of a flat bed without using bedrails: A little  Moving to and from bed to chair (including a wheelchair): A little  Standing up from a chair using your arms (e.g. wheelchair or bedside chair): None  To walk in hospital room: None  Climbing 3-5 steps with railing: A little  Basic Mobility - Total Score: 20                             EDUCATION:     Education Documentation  No documentation  found.  Education Comments  No comments found.        GOALS:  Encounter Problems       Encounter Problems (Active)       PT Problem       Pt will demonstrate mod I for all bed mobility         Start:  03/04/25    Expected End:  03/11/25            Pt will demonstrate mod I for all transfers with WW        Start:  03/04/25    Expected End:  03/11/25            Pt will ambulate 250 ft with WW and mod I.        Start:  03/04/25    Expected End:  03/11/25            Pt will demonstrate good dynamic standing balance while performing a functional task.         Start:  03/04/25    Expected End:  03/11/25               Pain - Adult

## 2025-03-06 NOTE — PROGRESS NOTES
"Lila Gay is a 79 y.o. female on day 0 of admission presenting with S/P total right hip arthroplasty.    Subjective   Verbalizing no complaints at this time.  Rating her pain a 5 out of 10 but due to symptoms yesterday is relying on only acetaminophen for pain relief.  No further dizziness or nausea.  Fully participating in physical and Occupational Therapy.  Eagerly anticipating discharge to home.  No complaints of chest pain, lightheadedness, or shortness of breath.  Tolerating a diet with no nausea vomiting.  Voiding well.       Objective     Physical Exam  Constitutional:       Appearance: Normal appearance.   HENT:      Head: Normocephalic and atraumatic.      Nose: Nose normal.      Mouth/Throat:      Mouth: Mucous membranes are moist.   Cardiovascular:      Rate and Rhythm: Normal rate.   Pulmonary:      Effort: Pulmonary effort is normal.   Abdominal:      General: Abdomen is flat.      Palpations: Abdomen is soft.   Musculoskeletal:      Cervical back: Neck supple.      Comments: Right anterior hip area with Mepilex dressing clean dry and intact, plantar and dorsiflexion of right foot against resistance, DP pulse palpable   Skin:     General: Skin is warm and dry.   Neurological:      General: No focal deficit present.      Mental Status: She is alert.   Psychiatric:         Mood and Affect: Mood normal.         Last Recorded Vitals  Blood pressure 142/69, pulse 82, temperature 37.3 °C (99.1 °F), temperature source Temporal, resp. rate 18, height 1.626 m (5' 4\"), weight 99.8 kg (220 lb), SpO2 93%.  Intake/Output last 3 Shifts:  I/O last 3 completed shifts:  In: 1630 (16.3 mL/kg) [P.O.:180; I.V.:1250 (12.5 mL/kg); IV Piggyback:200]  Out: 700 (7 mL/kg) [Urine:700 (0.2 mL/kg/hr)]  Weight: 99.8 kg     Relevant Results  Results for orders placed or performed during the hospital encounter of 03/04/25 (from the past 24 hours)   CBC   Result Value Ref Range    WBC 7.2 4.4 - 11.3 x10*3/uL    nRBC 0.0 0.0 - 0.0 " /100 WBCs    RBC 3.00 (L) 4.00 - 5.20 x10*6/uL    Hemoglobin 9.5 (L) 12.0 - 16.0 g/dL    Hematocrit 29.2 (L) 36.0 - 46.0 %    MCV 97 80 - 100 fL    MCH 31.7 26.0 - 34.0 pg    MCHC 32.5 32.0 - 36.0 g/dL    RDW 12.8 11.5 - 14.5 %    Platelets 146 (L) 150 - 450 x10*3/uL   Basic Metabolic Panel   Result Value Ref Range    Glucose 111 (H) 74 - 99 mg/dL    Sodium 138 136 - 145 mmol/L    Potassium 4.0 3.5 - 5.3 mmol/L    Chloride 105 98 - 107 mmol/L    Bicarbonate 25 21 - 32 mmol/L    Anion Gap 12 10 - 20 mmol/L    Urea Nitrogen 26 (H) 6 - 23 mg/dL    Creatinine 0.87 0.50 - 1.05 mg/dL    eGFR 69 >60 mL/min/1.73m*2    Calcium 8.8 8.6 - 10.3 mg/dL     Scheduled medications  acetaminophen, 325 mg, oral, q8h  aspirin, 81 mg, oral, BID  atorvastatin, 10 mg, oral, Daily  citalopram, 10 mg, oral, Daily  ferrous sulfate, 65 mg of iron, oral, Every other day  levothyroxine, 100 mcg, oral, Daily  losartan 50 mg, hydroCHLOROthiazide 12.5 mg for Hyzaar 50 mg-12.5 mg, , oral, Daily  meloxicam, 7.5 mg, oral, Daily  polyethylene glycol, 17 g, oral, Daily  scopolamine, 1 patch, transdermal, q72h      Continuous medications  oxygen, 2 L/min, Last Rate: Stopped (03/05/25 2000)      PRN medications  PRN medications: bisacodyl, bisacodyl, cyclobenzaprine, HYDROcodone-acetaminophen, HYDROcodone-acetaminophen, HYDROmorphone, naloxone, ondansetron ODT **OR** ondansetron    Assessment/Plan   Assessment & Plan  S/P total right hip arthroplasty    Postop day 2 of right total hip replacement anterior approach  Physical and Occupational Therapy are on consult  Weightbearing as tolerated with walker  ASA for VTE prophylaxis  Labs reviewed  Multimodal pain regime  Home medications ordered for chronic medical conditions as appropriate  Bowel regime  Encourage incentive spirometry  Plans on discharge to home with home health care  Follow-up with Dr. Song as directed       I spent 35 minutes in the professional and overall care of this  patient.      AMIE CapellanC

## 2025-03-06 NOTE — CARE PLAN
Patient VSS and NVI. Tolerating diet appropriately. Pain managed with rest, repositioning, ice, and medication. Ambulating x1 assist with walker. Voiding adequately. No bm this shift. Surgical dressing CDI. No acute events during this shift.     The clinical goals for the shift include Patient will have no acute change in MS with pain medication overnight.

## 2025-03-06 NOTE — PROGRESS NOTES
Spiritual Care Visit  Spiritual Care Request    Reason for Visit:  Routine Visit: Introduction     Request Received From:       Focus of Care:  Visited With: Patient         Refer to :          Spiritual Care Assessment    Spiritual Assessment:                      Care Provided:  Intended Effects: Build relationship of care and support, Preserve dignity and respect, Demonstrate caring and concern, Establish rapport and connectedness  Methods: Offer spiritual/Restorationism support  Interventions: Active listening, Ask guided questions, Perform a Restorationism rite or ritual, Share words of hope and inspiration    Sense of Community and or Anabaptism Affiliation:  Islam         Addressed Needs/Concerns and/or Susanne Through:     Sacramental Encounters  Sacrament of Sick-Anointing: Anointed    Outcome:        Advance Directives:         Spiritual Care Annotation    Annotation:

## 2025-03-06 NOTE — DISCHARGE SUMMARY
Discharge Diagnosis  S/P total right hip arthroplasty    Issues Requiring Follow-Up  ***    Test Results Pending At Discharge  Pending Labs       No current pending labs.            Hospital Course   ***    Pertinent Physical Exam At Time of Discharge  Physical Exam    Home Medications     Medication List      ASK your doctor about these medications     atorvastatin 10 mg tablet; Commonly known as: Lipitor   calcium carbonate 500 mg calcium (1,250 mg) chewable tablet   chlorhexidine 0.12 % solution; Commonly known as: Peridex; 15   milliliter(s) orally once a day for 2 doses 15 ml  the night before   surgery and 15 ml morning of surgery - swish for 30 seconds -DO NOT   SWALLOW, SPIT OUT   citalopram 10 mg tablet; Commonly known as: CeleXA   ferrous sulfate tablet   levothyroxine 100 mcg tablet; Commonly known as: Synthroid, Levoxyl   losartan-hydrochlorothiazide 50-12.5 mg tablet; Commonly known as:   Hyzaar   multivitamin with minerals tablet   PRESERVISION AREDS-2 ORAL   turmeric 400 mg capsule   vitamin D3-folic acid 250 mcg (10,000 unit)-1 mg tablet       Outpatient Follow-Up  No future appointments.    Jeannine Storm PA-C

## 2025-05-13 ENCOUNTER — HOSPITAL ENCOUNTER (OUTPATIENT)
Dept: RADIOLOGY | Facility: HOSPITAL | Age: 80
Discharge: HOME | End: 2025-05-13
Payer: MEDICARE

## 2025-05-13 ENCOUNTER — APPOINTMENT (OUTPATIENT)
Dept: ORTHOPEDIC SURGERY | Facility: CLINIC | Age: 80
End: 2025-05-13
Payer: MEDICARE

## 2025-05-13 DIAGNOSIS — M16.11 PRIMARY OSTEOARTHRITIS OF RIGHT HIP: ICD-10-CM

## 2025-05-13 DIAGNOSIS — Z96.641 S/P TOTAL RIGHT HIP ARTHROPLASTY: ICD-10-CM

## 2025-05-13 PROCEDURE — 1159F MED LIST DOCD IN RCRD: CPT | Performed by: ORTHOPAEDIC SURGERY

## 2025-05-13 PROCEDURE — 1036F TOBACCO NON-USER: CPT | Performed by: ORTHOPAEDIC SURGERY

## 2025-05-13 PROCEDURE — 99203 OFFICE O/P NEW LOW 30 MIN: CPT | Performed by: ORTHOPAEDIC SURGERY

## 2025-05-13 PROCEDURE — 73502 X-RAY EXAM HIP UNI 2-3 VIEWS: CPT | Mod: RT

## 2025-05-13 NOTE — PROGRESS NOTES
Chief Complaint   Patient presents with    Right Hip - Pain     Rt THR 3/4/25       The patient is here for follow-up of their right total hip arthroplasty.  The patient has minimal hip pain.  The patient has no mechanical symptoms.  The patient is approximately 3 months postop.    Physical examination:    Right lower extremity: Patient has a well-healing anterior surgical incision.    Intact sensation in the lateral femoral cutaneous nerve distribution.    Sensation intact light touch in the femoral nerve distribution.    Patient has active extension of the quads and able to fire quadricep musculature.    Minimal swelling about the thigh.    Otherwise neurovascular intact distally.    Tolerates gentle range of motion of the hip.    Able to ambulate in room with a minimally antalgic gait.    Radiographs:    AP pelvis and lateral imaging of the Right hip was obtained on the date of this exam to evaluate for maintenance of total hip arthroplasty.    Radiographs demonstrate well-seated total hip arthroplasty.  Findings are consistent with persistent avulsion of the tip of the greater trochanter with no evidence of further involvement greater trochanter and stable implant.  No significant change in position or alignment from postoperative films.  Alignment appropriate and adequate.      Impression:  Status post Right total hip arthroplasty    Plan:  Patient doing well overall.  Patient has obtained a near full functional recovery at this point.    Recommend follow-up in 3 to 6 months for repeat clinical and radiographic assessment of the right hip.    All questions answered

## (undated) DEVICE — BANDAGE, COFLEX, 6 X 5 YDS, FOAM TAN, STERILE, LF

## (undated) DEVICE — GOWN, SURGICAL, NON-REINFORCED, XLARGE, LF

## (undated) DEVICE — SEALER, BIPOLAR, AQUA MANTYS 6.0

## (undated) DEVICE — DRAPE, C-ARM IMAGE

## (undated) DEVICE — NEEDLE, SPINAL, QUINCKE, 18 G X 3.5 IN, PINK HUB

## (undated) DEVICE — SYRINGE, LUER LOCK, 12ML

## (undated) DEVICE — Device

## (undated) DEVICE — IRRIGATION SET, CYSTOSCOPY, REGULATING CLAMP, STRAIGHT, 81 IN

## (undated) DEVICE — TOWEL PACK, STERILE, 4/PACK, BLUE

## (undated) DEVICE — SOLUTION, IRRIGATION, USP, SODIUM CHLORIDE 0.9%, 3000 ML, BAG

## (undated) DEVICE — DRESSING, MEPILEX BORDER, POST-OP AG, 4 X 10 IN

## (undated) DEVICE — SUTURE, STARTAFIX, SYMMETRIC, PDS PLUS, 60CM CT-1

## (undated) DEVICE — SUTURE, STRATAFIX, SPIRAL MONOCRYL PLUS, 3-0, PS-2 45CM, UNDYED

## (undated) DEVICE — DRAPE, ISOLATION, ANTIMICROBIAL, IOBAN, W/FILM & POUCH, LARGE, 32 X 70 CM

## (undated) DEVICE — WOUND SYSTEM, DEBRIDEMENT & CLEANING, O.R DUOPAK

## (undated) DEVICE — DRAPE, SHEET, THREE QUARTER, FAN FOLD, 57 X 77 IN

## (undated) DEVICE — SOLUTION, IRRIGATION, SODIUM CHLORIDE 0.9%, 1000 ML, POUR BOTTLE

## (undated) DEVICE — HOOD, STERISHIELD T4 SYSTEM

## (undated) DEVICE — SUTURE, VICRYL, 1, 36 IN, CT-1, UNDYED

## (undated) DEVICE — GLOVE, SURGICAL, PROTEXIS PI BLUE W/NEUTHERA, 7.5, PF, LF

## (undated) DEVICE — BLADE, SAGITTAL, THIN, SHORT, LF

## (undated) DEVICE — SUTURE, VICRYL, 2-0, 36 IN, CT-1, UNDYED

## (undated) DEVICE — CLOSURE SYSTEM, DERMABOND, PRINEO, 22CM, STERILE